# Patient Record
Sex: FEMALE
[De-identification: names, ages, dates, MRNs, and addresses within clinical notes are randomized per-mention and may not be internally consistent; named-entity substitution may affect disease eponyms.]

---

## 2017-04-07 ENCOUNTER — HOSPITAL ENCOUNTER (OUTPATIENT)
Dept: HOSPITAL 42 - ENDO | Age: 72
Discharge: HOME | End: 2017-04-07
Attending: INTERNAL MEDICINE
Payer: COMMERCIAL

## 2017-04-07 VITALS
RESPIRATION RATE: 18 BRPM | HEART RATE: 68 BPM | SYSTOLIC BLOOD PRESSURE: 116 MMHG | OXYGEN SATURATION: 99 % | DIASTOLIC BLOOD PRESSURE: 61 MMHG | TEMPERATURE: 97.7 F

## 2017-04-07 VITALS — BODY MASS INDEX: 24.5 KG/M2

## 2017-04-07 DIAGNOSIS — K64.8: ICD-10-CM

## 2017-04-07 DIAGNOSIS — K51.90: Primary | ICD-10-CM

## 2017-04-07 PROCEDURE — 88305 TISSUE EXAM BY PATHOLOGIST: CPT

## 2017-04-07 PROCEDURE — 45380 COLONOSCOPY AND BIOPSY: CPT

## 2018-02-28 ENCOUNTER — HOSPITAL ENCOUNTER (EMERGENCY)
Dept: HOSPITAL 42 - ED | Age: 73
Discharge: HOME | End: 2018-02-28
Payer: COMMERCIAL

## 2018-02-28 VITALS — DIASTOLIC BLOOD PRESSURE: 77 MMHG | TEMPERATURE: 98 F | SYSTOLIC BLOOD PRESSURE: 130 MMHG | HEART RATE: 80 BPM

## 2018-02-28 VITALS — BODY MASS INDEX: 24.5 KG/M2

## 2018-02-28 VITALS — RESPIRATION RATE: 19 BRPM | OXYGEN SATURATION: 99 %

## 2018-02-28 DIAGNOSIS — R05: ICD-10-CM

## 2018-02-28 DIAGNOSIS — J32.9: Primary | ICD-10-CM

## 2018-02-28 NOTE — RAD
HISTORY:

cough  



COMPARISON:

01/09/2017



TECHNIQUE:

Chest PA and lateral



FINDINGS:



LUNGS:

No active pulmonary disease.



PLEURA:

No significant pleural effusion identified. No pneumothorax apparent.



CARDIOVASCULAR:

Normal.



OSSEOUS STRUCTURES:

No significant abnormalities.



VISUALIZED UPPER ABDOMEN:

Normal.



OTHER FINDINGS:

None.



IMPRESSION:

No active disease.

## 2018-02-28 NOTE — ED PDOC
Arrival/HPI





- General


Chief Complaint: Cough, Cold, Congestion


Time Seen by Provider: 18 14:29


Historian: Patient





- History of Present Illness


Narrative History of Present Illness (Text): 





18 15:16


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 

nasal congestion, facial pressure and right ear pain. States cough started 

3weeks ago. Notes  that she was seen by her PMD 2weeks ago and was only given 

Promethazine. States taking the Promethazine without relieve. She reports 

multiple history of sinusitis. Denies fever, chills, SOB, diaphoresis, sick 

contact, travel, any other complaint.








Past Medical History





- Provider Review


Nursing Documentation Reviewed: Yes





- Past History


Past History: No Previous





- Infectious Disease


Hx of Infectious Diseases: None





- Tetanus Immunization


Tetanus Immunization: Unknown





- Cardiac


Hx Pacemaker: No





- Pulmonary


Hx Respiratory Disorders: Yes


Hx Asthma: Yes


Hx Chronic Obstructive Pulmonary Disease (COPD): Yes





- Neurological


Hx Paralysis: No





- HEENT


Hx HEENT Disorder: Yes


Hx Deafness: Yes (left)





- Renal


Hx Renal Disorder: No





- Endocrine/Metabolic


Hx Endocrine Disorders: No





- Hematological/Oncological


Hx Blood Disorders: Yes


Hx Blood Transfusions: No


Hx Blood Transfusion Reaction: No


Hx Cirrhosis: Yes





- Integumentary


Hx Dermatological Disorder: No





- Musculoskeletal/Rheumatological


Hx Musculoskeletal Disorders: Yes





- Gastrointestinal


Hx Gastrointestinal Disorders: Yes


Hx Colitis: Yes (Ulcerative Colitis dx )





- Genitourinary/Gynecological


Hx Genitourinary Disorders: Yes


Hx Hematuria: Yes





- Psychiatric


Hx Psychophysiologic Disorder: No


Hx Emotional Abuse: No


Hx Physical Abuse: No


Hx Substance Use: No





- Past Surgical History


Past Surgical History: Non-Contributing





- Surgical History


Hx  Section: Yes


Other/Comment: sinus surgery.  gallstones





- Anesthesia


Hx Anesthesia: Yes


Hx Anesthesia Reactions: No


Hx Malignant Hyperthermia: No





- Suicidal Assessment


Feels Threatened In Home Enviroment: No





Family/Social History





- Physician Review


Nursing Documentation Reviewed: Yes


Family/Social History: Unknown Family HX


Smoking Status: Never Smoked


Hx Alcohol Use: No


Hx Substance Use: No


Hx Substance Use Treatment: No





Allergies/Home Meds


Allergies/Adverse Reactions: 


Allergies





aspirin Allergy (Verified 18 13:51)


 ANAPHYLAXIS


ibuprofen Allergy (Verified 18 13:51)


 ANAPHYLAXIS








Home Medications: 


 Home Meds











 Medication  Instructions  Recorded  Confirmed


 


Albuterol Sulfate [Proair 2 puff IH BID PRN 16





Respiclick]   


 


Fluticasone Nasal [Flonase] 1 spr NS BID 16


 


Calcium Carbonate/Vitamin D3 1 tab PO DAILY 17





[Calcium 500 + Vit D Caplet]   


 


Fluticasone/Salmeterol 250/50 1 puff IH BID 17





[Advair Diskus]   


 


Budesonide [Rhinocort Allergy] 1 spray INH DAILY 17


 


Ciprofloxacin/Dexamethasone 1 drop TOP DAILY PRN 17





[Ciprodex Otic]   














Review of Systems





- Physician Review


All systems were reviewed & negative as marked: Yes





- Review of Systems


Constitutional: Normal


Eyes: Normal


ENT: Other (Right ear pain).  absent: Rhinorrhea (Nasal congestion)


Respiratory: Cough.  absent: SOB, Sputum, Wheezing


Cardiovascular: Normal


Gastrointestinal: Normal


Genitourinary Female: Normal


Musculoskeletal: Normal


Skin: Normal


Neurological: Normal


Endocrine: Normal


Hemo/Lymphatic: Normal


Psychiatric: Normal





Physical Exam


Vital Signs Reviewed: Yes


Vital Signs











  Temp Pulse Resp BP Pulse Ox


 


 18 16:29    19   99


 


 18 16:28  98.0 F  80  18  130/77  96


 


 18 13:52  99.2 F  99 H  18  129/65  100











Temperature: Afebrile


Blood Pressure: Normal


Pulse: Regular


Respiratory Rate: Normal


Appearance: Positive for: Well-Appearing, Non-Toxic, Comfortable


Pain Distress: None


Mental Status: Positive for: Alert and Oriented X 3





- Systems Exam


Head: Present: Atraumatic, Normocephalic


Pupils: Present: PERRL


Extroacular Muscles: Present: EOMI


Conjunctiva: Present: Normal


Ears: Present: Erythema (Right TM), Fluid (Right TM).  No: Normal Canal, TM 

Bulging, TM Perf


Mouth: Present: Moist Mucous Membranes


Nose (Internal): Present: Edematous (B/L turbinates), Other (Tenderness over 

the frontal and maxillary sinuses)


Neck: Present: Normal Range of Motion


Respiratory/Chest: Present: Clear to Auscultation, Good Air Exchange.  No: 

Respiratory Distress, Accessory Muscle Use


Cardiovascular: Present: Regular Rate and Rhythm, Normal S1, S2.  No: Murmurs


Abdomen: Present: Normal Bowel Sounds.  No: Tenderness, Distention, Peritoneal 

Signs


Back: Present: Normal Inspection


Upper Extremity: Present: Normal Inspection.  No: Cyanosis, Edema


Lower Extremity: Present: Normal Inspection.  No: Edema


Neurological: Present: GCS=15, CN II-XII Intact, Speech Normal


Skin: Present: Warm, Dry, Normal Color.  No: Rashes


Psychiatric: Present: Alert, Oriented x 3, Normal Insight, Normal Concentration





Medical Decision Making


ED Course and Treatment: 





18 22:26


Pt in ED for stated history. Her CXR was negative. Rapid flu was negative.





PT have sinusitis clinically and was treated with Augmentin. Referred to her 

PMD.





- Lab Interpretations


Lab Results: 


 Lab Results





18 15:22: Influenza Typ A,B (EIA) Negative for flu a/b











- RAD Interpretation


Radiology Orders: 








18 14:29


CHEST TWO VIEWS (PA/LAT) [RAD] Stat 














- Medication Orders


Current Medication Orders: 











Discontinued Medications





Amoxicillin/Clavulanate Potassium (Augmentin 875 Mg-125 Mg Tab)  1 tab PO STAT 

STA


   PRN Reason: Protocol


   Stop: 18 15:15


   Last Admin: 18 15:37  Dose: 1 tab





Prednisone (Prednisone Tab)  40 mg PO STAT STA


   Stop: 18 15:16


   Last Admin: 18 15:37  Dose: 40 mg











Disposition/Present on Arrival





- Present on Arrival


Any Indicators Present on Arrival: No


History of DVT/PE: No


History of Uncontrolled Diabetes: No


Urinary Catheter: No


History of Decub. Ulcer: No


History Surgical Site Infection Following: None





- Disposition


Have Diagnosis and Disposition been Completed?: Yes


Diagnosis: 


 Sinusitis, Cough





Disposition: HOME/ ROUTINE


Disposition Time: 16:15


Patient Plan: Discharge


Condition: STABLE


Discharge Instructions (ExitCare):  Sinusitis in Adults


Additional Instructions: 


Take medication as directed


Follow up with your doctor


Return to ED for any new symptoms


Prescriptions: 


Amoxicillin/Clavulanate [Augmentin 875 MG-125 MG] 1 tab PO BID #14 tab


Referrals: 


[facility] [Outside] - Follow up with primary


Forms:  [employer] (English)

## 2018-04-24 ENCOUNTER — HOSPITAL ENCOUNTER (OUTPATIENT)
Dept: HOSPITAL 42 - ED | Age: 73
Discharge: HOME | End: 2018-04-24
Attending: RADIOLOGY
Payer: COMMERCIAL

## 2018-04-24 VITALS
SYSTOLIC BLOOD PRESSURE: 136 MMHG | DIASTOLIC BLOOD PRESSURE: 80 MMHG | RESPIRATION RATE: 18 BRPM | HEART RATE: 79 BPM | TEMPERATURE: 97.9 F

## 2018-04-24 VITALS — BODY MASS INDEX: 23.1 KG/M2

## 2018-04-24 VITALS — OXYGEN SATURATION: 99 %

## 2018-04-24 DIAGNOSIS — Z88.6: ICD-10-CM

## 2018-04-24 DIAGNOSIS — J44.9: ICD-10-CM

## 2018-04-24 DIAGNOSIS — C22.1: Primary | ICD-10-CM

## 2018-04-24 LAB
ALBUMIN SERPL-MCNC: 3.1 G/DL (ref 3–4.8)
ALBUMIN/GLOB SERPL: 0.7 {RATIO} (ref 1.1–1.8)
ALT SERPL-CCNC: 48 U/L (ref 7–56)
AST SERPL-CCNC: 78 U/L (ref 14–36)
BASOPHILS # BLD AUTO: 0.01 K/MM3 (ref 0–2)
BASOPHILS NFR BLD: 0.2 % (ref 0–3)
BUN SERPL-MCNC: 13 MG/DL (ref 7–21)
CALCIUM SERPL-MCNC: 8.7 MG/DL (ref 8.4–10.5)
EOSINOPHIL # BLD: 0.4 10*3/UL (ref 0–0.7)
EOSINOPHIL NFR BLD: 7.8 % (ref 1.5–5)
ERYTHROCYTE [DISTWIDTH] IN BLOOD BY AUTOMATED COUNT: 14.8 % (ref 11.5–14.5)
GFR NON-AFRICAN AMERICAN: > 60
GRANULOCYTES # BLD: 3.49 10*3/UL (ref 1.4–6.5)
GRANULOCYTES NFR BLD: 64.6 % (ref 50–68)
HGB BLD-MCNC: 10.6 G/DL (ref 12–16)
LIPASE SERPL-CCNC: 201 U/L (ref 23–300)
LYMPHOCYTES # BLD: 1.1 10*3/UL (ref 1.2–3.4)
LYMPHOCYTES NFR BLD AUTO: 20.4 % (ref 22–35)
MCH RBC QN AUTO: 31.7 PG (ref 25–35)
MCHC RBC AUTO-ENTMCNC: 33.3 G/DL (ref 31–37)
MCV RBC AUTO: 95.2 FL (ref 80–105)
MONOCYTES # BLD AUTO: 0.4 10*3/UL (ref 0.1–0.6)
MONOCYTES NFR BLD: 7 % (ref 1–6)
PLATELET # BLD: 222 10^3/UL (ref 120–450)
PMV BLD AUTO: 11.6 FL (ref 7–11)
RBC # BLD AUTO: 3.34 10^6/UL (ref 3.5–6.1)
WBC # BLD AUTO: 5.4 10^3/UL (ref 4.5–11)

## 2018-04-24 PROCEDURE — 76937 US GUIDE VASCULAR ACCESS: CPT

## 2018-04-24 PROCEDURE — 36561 INSERT TUNNELED CV CATH: CPT

## 2018-04-24 PROCEDURE — 96365 THER/PROPH/DIAG IV INF INIT: CPT

## 2018-04-24 PROCEDURE — 80053 COMPREHEN METABOLIC PANEL: CPT

## 2018-04-24 PROCEDURE — 77001 FLUOROGUIDE FOR VEIN DEVICE: CPT

## 2018-04-24 PROCEDURE — 85025 COMPLETE CBC W/AUTO DIFF WBC: CPT

## 2018-04-24 PROCEDURE — 99152 MOD SED SAME PHYS/QHP 5/>YRS: CPT

## 2018-04-24 PROCEDURE — 99153 MOD SED SAME PHYS/QHP EA: CPT

## 2018-04-24 PROCEDURE — 83690 ASSAY OF LIPASE: CPT

## 2018-04-24 PROCEDURE — 99284 EMERGENCY DEPT VISIT MOD MDM: CPT

## 2018-04-24 NOTE — VASCULAR
PROCEDURE:  Ultrasound and fluoroscopic right internal jugular venous 

access port.



CLINICAL HISTORY:  Cholangiocarcinoma.Venous port for chemotherapy.



PHYSICIAN(S):  Salas Jules M.D.



TECHNIQUE:

The relative risks and indications of the procedure were explained to 

the patient and consent obtained. The patient was placed supine on 

the arteriogram table and the right neck and chest prepped and draped 

in the usual sterile fashion. Conscious sedation monitoring was 

provided throughout the procedure by a nurse. Antibiotics were given 

prior to the procedure.



Under direct ultrasound guidance, the right internal jugular vein was 

punctured with a micro-puncture set. A 0.035 angled Glidewire was 

advanced into the IVC. A 4 cm incision was made below the right 

clavicle and the pocket blunted dissected. A 8 [place] single-lumen 

catheter, 18 cm long, was advanced to the SVC/RA junction. The 

catheter was trimmed and attached to the port. The port aspirates and 

injects easily. The port was placed in the pocket and closed in 2 

layers. The patient tolerated the procedure well.



IMPRESSION:

Ultrasound and fluoroscopically placed right internal jugular venous 

access port.

## 2018-04-24 NOTE — ED PDOC
Arrival/HPI





- General


Chief Complaint: Abdominal Pain


Time Seen by Provider: 04/24/18 09:48


Historian: Patient





- History of Present Illness


Narrative History of Present Illness (Text): 


04/24/18 10:29


Patient is a 73 year old female with a past medical history bile duct cancer 

with recent stent placement on 04/20/18, who presents to the emergency 

department for port placement. Patient reports she will be starting 

chemotherapy tomorrow. Patient notes mild abdominal discomfort, which she has 

been experiencing intermittently for some time now. Patient denies any fever 

chills, headache, dizziness, chest pain, shortness of breath, dyspnea on 

exertion, cough, nausea, vomiting, diarrhea, back pain, neck pain, or any other 

complaint.





PMD: Dr. Milian


 





Past Medical History





- Provider Review


Nursing Documentation Reviewed: Yes





- Past History


Past History: No Previous





- Infectious Disease


Hx of Infectious Diseases: None





- Tetanus Immunization


Tetanus Immunization: Unknown





- Cardiac


Hx Pacemaker: No





- Pulmonary


Hx Respiratory Disorders: Yes


Hx Asthma: Yes


Hx Chronic Obstructive Pulmonary Disease (COPD): Yes





- Neurological


Hx Paralysis: No





- HEENT


Hx HEENT Disorder: Yes


Hx Deafness: Yes (left)





- Renal


Hx Renal Disorder: No





- Endocrine/Metabolic


Hx Endocrine Disorders: No





- Hematological/Oncological


Hx Blood Disorders: No





- Integumentary


Hx Dermatological Disorder: No





- Musculoskeletal/Rheumatological


Hx Musculoskeletal Disorders: Yes





- Gastrointestinal


Hx Gastrointestinal Disorders: Yes


Hx Colitis: Yes (Ulcerative Colitis dx 2011)





- Genitourinary/Gynecological


Hx Genitourinary Disorders: Yes


Hx Hematuria: Yes





- Psychiatric


Hx Psychophysiologic Disorder: Yes


Hx Substance Use: No





- Past Surgical History


Past Surgical History: Non-Contributing





- Surgical History


Other/Comment: sinus sx 4x





- Anesthesia


Hx Anesthesia: Yes


Hx Anesthesia Reactions: No


Hx Malignant Hyperthermia: No





- Suicidal Assessment


Feels Threatened In Home Enviroment: No





Family/Social History





- Physician Review


Nursing Documentation Reviewed: Yes


Family/Social History: No Known Family HX


Smoking Status: Never Smoked


Hx Alcohol Use: No


Hx Substance Use: No


Hx Substance Use Treatment: No





Allergies/Home Meds


Allergies/Adverse Reactions: 


Allergies





aspirin Allergy (Verified 04/24/18 10:13)


 ANAPHYLAXIS


ibuprofen Allergy (Verified 04/24/18 10:13)


 ANAPHYLAXIS








Home Medications: 


 Home Meds











 Medication  Instructions  Recorded  Confirmed


 


Albuterol Sulfate [Proair 2 puff IH BID PRN 07/30/16 04/24/18





Respiclick]   


 


Fluticasone Nasal [Flonase] 1 spr NS BID 12/09/16 04/24/18


 


Calcium Carbonate/Vitamin D3 1 tab PO DAILY 03/29/17 04/24/18





[Calcium 500 + Vit D Caplet]   


 


Fluticasone/Salmeterol 250/50 1 puff IH BID 03/29/17 04/24/18





[Advair Diskus]   


 


Budesonide [Rhinocort Allergy] 1 spray INH DAILY 04/07/17 04/24/18


 


Ciprofloxacin/Dexamethasone 1 drop TOP DAILY PRN 04/07/17 04/24/18





[Ciprodex Otic]   














Review of Systems





- Physician Review


All systems were reviewed & negative as marked: Yes





- Review of Systems


Constitutional: absent: Fevers, Night Sweats


Respiratory: absent: SOB, Cough


Cardiovascular: absent: Chest Pain, JONES


Gastrointestinal: Abdominal Pain.  absent: Diarrhea, Nausea, Vomiting


Musculoskeletal: absent: Back Pain, Neck Pain


Neurological: absent: Headache, Dizziness





Physical Exam


Vital Signs Reviewed: Yes


Vital Signs











  Temp Pulse Resp BP Pulse Ox


 


 04/24/18 15:08      99


 


 04/24/18 13:05  97.9 F  79  18  136/80  99


 


 04/24/18 12:55  98.4 F  74  16  125/63  99


 


 04/24/18 12:40  98.4 F  74  16  122/63  99


 


 04/24/18 12:25  98.4 F  78  16  133/78  99


 


 04/24/18 10:14   82  17  125/71  100











Blood Pressure: Normal


Pulse: Regular


Respiratory Rate: Normal


Appearance: Positive for: Well-Appearing, Non-Toxic, Comfortable


Pain Distress: None


Mental Status: Positive for: Alert and Oriented X 3





- Systems Exam


Head: Present: Atraumatic, Normocephalic


Pupils: Present: PERRL


Extroacular Muscles: Present: EOMI


Conjunctiva: Present: Normal


Mouth: Present: Moist Mucous Membranes


Neck: Present: Normal Range of Motion


Respiratory/Chest: Present: Clear to Auscultation, Good Air Exchange.  No: 

Respiratory Distress, Accessory Muscle Use


Cardiovascular: Present: Regular Rate and Rhythm, Normal S1, S2.  No: Murmurs


Abdomen: Present: Tenderness (epigastric tenderness), Normal Bowel Sounds, 

Guarding, Other (Wounds are clean, dry and intact).  No: Distention, Peritoneal 

Signs, Rebound


Back: Present: Normal Inspection


Upper Extremity: Present: Normal Inspection.  No: Cyanosis, Edema


Lower Extremity: Present: Normal Inspection.  No: Edema


Neurological: Present: GCS=15, CN II-XII Intact, Speech Normal


Skin: Present: Warm, Dry, Normal Color.  No: Rashes


Psychiatric: Present: Alert, Oriented x 3, Normal Insight, Normal Concentration





Medical Decision Making


ED Course and Treatment: 


04/24/18 10:28


Impression:


Patient is a 73 year old female who presents for port placement. Patient notes 

old abdominal pain.





Differential Diagnosis included but are not limited to:  Port placement, Acute 

on chronic abdominal pain





Plan:


-- Labs


-- Pepcid


-- Reassess and disposition





Prior Visits:


Notes and results from previous visits were reviewed. Patient last seen in ED 

on 2/28/18 complaining of cough, cold, and congestion.    





Progress Notes:


04/24/18 10:29


Case discussed with Dr. Salas Jules, accepts patient for port placement. 

Patient aware of and in agreement with plan. 





Labs reviewed. 





- Lab Interpretations


Lab Results: 








 04/24/18 10:30 





 04/24/18 10:30 





 Lab Results





04/24/18 10:30: Sodium 141, Potassium 3.8, Chloride 108 H, Carbon Dioxide 23, 

Anion Gap 14, BUN 13, Creatinine 0.6 L, Est GFR ( Amer) > 60, Est GFR (

Non-Af Amer) > 60, Random Glucose 106, Calcium 8.7, Total Bilirubin 2.2 H, AST 

78 H D, ALT 48, Alkaline Phosphatase 266 H, Total Protein 7.4, Albumin 3.1, 

Globulin 4.4, Albumin/Globulin Ratio 0.7 L, Lipase 201


04/24/18 10:30: WBC 5.4, RBC 3.34 L, Hgb 10.6 L, Hct 31.8 L, MCV 95.2  D, MCH 

31.7, MCHC 33.3, RDW 14.8 H, Plt Count 222, MPV 11.6 H, Gran % 64.6, Lymph % (

Auto) 20.4 L, Mono % (Auto) 7.0 H, Eos % (Auto) 7.8 H, Baso % (Auto) 0.2, Gran 

# 3.49, Lymph # (Auto) 1.1 L, Mono # (Auto) 0.4, Eos # (Auto) 0.4, Baso # (Auto

) 0.01








I have reviewed the lab results: Yes





- RAD Interpretation


Radiology Orders: 








04/24/18 10:08


CAR PERIPHERAL VASCULAR ORDER [VASCULAR] Stat 














- Medication Orders


Current Medication Orders: 








Acetaminophen (Tylenol 325mg Tab)  650 mg PO Q4 PRN


   PRN Reason: Pain, Mild (1-3)


Sodium Chloride (Sodium Chloride 0.45%)  1,000 mls @ 80 mls/hr IV .[id number] EDISON


Ondansetron HCl (Zofran Inj)  4 mg IVP Q6H PRN


   PRN Reason: Nausea/Vomiting


Oxycodone/Acetaminophen (Percocet 5/325 Mg Tab)  1 tab PO Q4H PRN


   PRN Reason: Pain, moderate (4-7)


   Stop: 04/27/18 12:12





Discontinued Medications





Famotidine (Pepcid 20mg/50ml Premix)  20 mg in 50 mls @ 100 mls/hr IVPB STAT STA


   Stop: 04/24/18 10:59


   Last Admin: 04/24/18 10:49  Dose: 100 mls/hr





eMAR Start Stop


 Document     04/24/18 10:49  SZA  (Rec: 04/24/18 10:50  SZA  [id number])


     Intravenous Solution


      Start Date                                 04/24/18


      Start Time                                 10:50


      End Date                                   04/24/18


      End time                                   11:10


      Total Infusion Time                        20














- Scribe Statement


The provider has reviewed the documentation as recorded by the Scribe





[employer] Sylvia Kwok


Provider Scribe Attestation:


All medical record entries made by the Scribe were at my direction and 

personally dictated by me. I have reviewed the chart and agree that the record 

accurately reflects my personal performance of the history, physical exam, 

medical decision making, and the department course for this patient. I have 

also personally directed, reviewed, and agree with the discharge instructions 

and disposition.








Disposition/Present on Arrival





- Present on Arrival


Any Indicators Present on Arrival: No


History of DVT/PE: No


History of Uncontrolled Diabetes: No


Urinary Catheter: No


History of Decub. Ulcer: No


History Surgical Site Infection Following: None





- Disposition


Have Diagnosis and Disposition been Completed?: Yes


Diagnosis: 


 Abdominal pain





Disposition: HOME/ ROUTINE


Disposition Time: 10:29


Patient Plan: Admission


Condition: FAIR

## 2018-06-06 ENCOUNTER — HOSPITAL ENCOUNTER (INPATIENT)
Dept: HOSPITAL 42 - ED | Age: 73
LOS: 9 days | Discharge: NURSING, REHAB OR LONG-TERM CARE | DRG: 872 | End: 2018-06-15
Attending: INTERNAL MEDICINE | Admitting: INTERNAL MEDICINE
Payer: COMMERCIAL

## 2018-06-06 VITALS — BODY MASS INDEX: 24.9 KG/M2

## 2018-06-06 DIAGNOSIS — R65.20: ICD-10-CM

## 2018-06-06 DIAGNOSIS — C78.7: ICD-10-CM

## 2018-06-06 DIAGNOSIS — H91.8X2: ICD-10-CM

## 2018-06-06 DIAGNOSIS — Z86.19: ICD-10-CM

## 2018-06-06 DIAGNOSIS — J32.9: ICD-10-CM

## 2018-06-06 DIAGNOSIS — R50.81: ICD-10-CM

## 2018-06-06 DIAGNOSIS — D61.818: ICD-10-CM

## 2018-06-06 DIAGNOSIS — J44.9: ICD-10-CM

## 2018-06-06 DIAGNOSIS — K83.0: ICD-10-CM

## 2018-06-06 DIAGNOSIS — Z87.891: ICD-10-CM

## 2018-06-06 DIAGNOSIS — G43.909: ICD-10-CM

## 2018-06-06 DIAGNOSIS — B37.0: ICD-10-CM

## 2018-06-06 DIAGNOSIS — Z66: ICD-10-CM

## 2018-06-06 DIAGNOSIS — R33.9: ICD-10-CM

## 2018-06-06 DIAGNOSIS — D70.3: ICD-10-CM

## 2018-06-06 DIAGNOSIS — M06.9: ICD-10-CM

## 2018-06-06 DIAGNOSIS — E87.6: ICD-10-CM

## 2018-06-06 DIAGNOSIS — Z88.6: ICD-10-CM

## 2018-06-06 DIAGNOSIS — A41.9: Primary | ICD-10-CM

## 2018-06-06 DIAGNOSIS — C22.1: ICD-10-CM

## 2018-06-06 DIAGNOSIS — M79.7: ICD-10-CM

## 2018-06-06 DIAGNOSIS — R18.8: ICD-10-CM

## 2018-06-06 DIAGNOSIS — M31.6: ICD-10-CM

## 2018-06-06 DIAGNOSIS — K74.60: ICD-10-CM

## 2018-06-06 DIAGNOSIS — A04.72: ICD-10-CM

## 2018-06-06 DIAGNOSIS — K51.90: ICD-10-CM

## 2018-06-06 DIAGNOSIS — M19.90: ICD-10-CM

## 2018-06-06 LAB
ALBUMIN SERPL-MCNC: 2.2 G/DL (ref 3–4.8)
ALBUMIN SERPL-MCNC: 2.3 G/DL (ref 3–4.8)
ALBUMIN/GLOB SERPL: 0.6 {RATIO} (ref 1.1–1.8)
ALBUMIN/GLOB SERPL: 0.6 {RATIO} (ref 1.1–1.8)
ALT SERPL-CCNC: 49 U/L (ref 7–56)
ALT SERPL-CCNC: 50 U/L (ref 7–56)
APTT BLD: 31.9 SECONDS (ref 25.1–36.5)
AST SERPL-CCNC: 39 U/L (ref 14–36)
AST SERPL-CCNC: 43 U/L (ref 14–36)
BASOPHILS # BLD AUTO: 0 K/MM3 (ref 0–2)
BASOPHILS NFR BLD: 0 % (ref 0–3)
BUN SERPL-MCNC: 14 MG/DL (ref 7–21)
BUN SERPL-MCNC: 14 MG/DL (ref 7–21)
CALCIUM SERPL-MCNC: 7.7 MG/DL (ref 8.4–10.5)
CALCIUM SERPL-MCNC: 8 MG/DL (ref 8.4–10.5)
EOSINOPHIL # BLD: 0 10*3/UL (ref 0–0.7)
EOSINOPHIL NFR BLD: 0 % (ref 1.5–5)
ERYTHROCYTE [DISTWIDTH] IN BLOOD BY AUTOMATED COUNT: 20.2 % (ref 11.5–14.5)
GFR NON-AFRICAN AMERICAN: > 60
GFR NON-AFRICAN AMERICAN: > 60
GRANULOCYTES # BLD: 0 10*3/UL (ref 1.4–6.5)
GRANULOCYTES NFR BLD: 0 % (ref 50–68)
HEPATITIS A IGM: NEGATIVE
HEPATITIS B CORE AB: NEGATIVE
HEPATITIS C ANTIBODY: NEGATIVE
HGB BLD-MCNC: 8.1 G/DL (ref 12–16)
HYPOCHROMIA BLD QL SMEAR: (no result)
INR PPP: 2 (ref 0.93–1.08)
LYMPHOCYTE: 53 % (ref 22–35)
LYMPHOCYTES # BLD: 0.5 10*3/UL (ref 1.2–3.4)
LYMPHOCYTES NFR BLD AUTO: 53.8 % (ref 22–35)
MCH RBC QN AUTO: 31.4 PG (ref 25–35)
MCHC RBC AUTO-ENTMCNC: 34 G/DL (ref 31–37)
MCV RBC AUTO: 92.2 FL (ref 80–105)
MONOCYTE: 43 % (ref 1–6)
MONOCYTES # BLD AUTO: 0.4 10*3/UL (ref 0.1–0.6)
MONOCYTES NFR BLD: 46.2 % (ref 1–6)
NEUTROPHILS NFR BLD AUTO: 4 % (ref 50–70)
PLATELET # BLD EST: (no result) 10*3/UL
PLATELET # BLD: 31 10^3/UL (ref 120–450)
PROTHROMBIN TIME: 23.1 SECONDS (ref 9.4–12.5)
RBC # BLD AUTO: 2.58 10^6/UL (ref 3.5–6.1)
TROPONIN I SERPL-MCNC: < 0.01 NG/ML
WBC # BLD AUTO: 0.9 10^3/UL (ref 4.5–11)

## 2018-06-06 PROCEDURE — 30233K1 TRANSFUSION OF NONAUTOLOGOUS FROZEN PLASMA INTO PERIPHERAL VEIN, PERCUTANEOUS APPROACH: ICD-10-PCS | Performed by: INTERNAL MEDICINE

## 2018-06-06 RX ADMIN — NYSTATIN SCH ML: 100000 SUSPENSION ORAL at 22:30

## 2018-06-06 RX ADMIN — METRONIDAZOLE SCH MLS/HR: 500 INJECTION, SOLUTION INTRAVENOUS at 19:44

## 2018-06-06 RX ADMIN — VANCOMYCIN HYDROCHLORIDE SCH MLS/HR: 1 INJECTION, POWDER, LYOPHILIZED, FOR SOLUTION INTRAVENOUS at 22:29

## 2018-06-06 NOTE — ED PDOC
Arrival/HPI





- Critical Care


Critical Care Minutes: 75 minutes





- General


Chief Complaint: Weakness/Neurological Deficit


Time Seen by Provider: 18 10:36





- Critical Care


Narrative Critical Care (Text): 





18 13:59


This is a 72 yo F with PMH of including Ulcerative Collitis, Cirrhosis, COPD, 

Giant cell arteritis, primary sclerosing cholangitis, and recently diagnosed 

Biliary cancer with mets to liver s/p biliary stent placement (2018) who 

presents with progressively worsening diarrhea x1 week, worsening weakness, 

lethargy, and acute onset jaundice.  Hx primarily through daughters at bedside 

given patient's lethargy, but she is oriented x3 (self, location, year), and is 

able to follow non-taxing commands appropriately.  As per daughters, patient 

underwent chemotherapy for her biliary cancer most recently 7 days prior ().  Since that time, she has developed worsening diarrhea, watery non-bloody, 

started at 2 times per day as per patient, now up to >5 times per day.  Patient 

denies any emesis, but does report nausea and "spitting up" clear to white 

phlegm mutiple times per day for the last 2 days.  Denies chest pain, shortness 

of breath, hemoptysis, syncope, or focal weakness, but daughters report she has 

been generally weak to the point of being essentially bedridden for the last 3 

days.  Denies fecal incontinence, but has accidents due to being unable to 

reach the bathroom from her bed in time (as per daughters).  Complaining of 

chills throughout exam despite multiple blankets.





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 

Hep A, asthma, COPD, Giant cell arteritis, Allergic Rhinitis, Sinusitis, 

Arthritis, Fibromyalgia, primary sclerosing cholangitis, and Migraines


PSH: sinus surgeries x4, cholecystectomy, 


FHx: asthma (Mother, Son), HTN (Mother)


SHx: lives alone, home health aide 2x per week, 2 daughters who also provide 

daily care,


       Former smoker; denies alcohol, illicits/IVDA





PMD: Previously Dr. Alaniz, now Dr. Milian


 (MicheleAmadeo)





Past Medical History





- Past History


Past History: No Previous





- Infectious Disease


Hx of Infectious Diseases: None





- Tetanus Immunization


Tetanus Immunization: Unknown





- Cardiac


Hx Pacemaker: No





- Pulmonary


Hx Respiratory Disorders: Yes


Hx Asthma: Yes


Hx Chronic Obstructive Pulmonary Disease (COPD): Yes





- Neurological


Hx Paralysis: No





- HEENT


Hx HEENT Disorder: Yes


Hx Deafness: Yes (left)





- Renal


Hx Renal Disorder: No





- Endocrine/Metabolic


Other/Comment: Stage 4 biliary CA





- Hematological/Oncological


Hx Cancer: Yes (stage 4 biliary CA)





- Integumentary


Hx Dermatological Disorder: No





- Musculoskeletal/Rheumatological


Hx Musculoskeletal Disorders: Yes





- Gastrointestinal


Hx Gastrointestinal Disorders: Yes


Hx Colitis: Yes (Ulcerative Colitis dx )





- Genitourinary/Gynecological


Hx Genitourinary Disorders: Yes


Hx Hematuria: Yes





- Psychiatric


Hx Psychophysiologic Disorder: Yes


Hx Substance Use: No





- Past Surgical History


Past Surgical History: Non-Contributing





- Surgical History


Other/Comment: sinus sx 4x





- Anesthesia


Hx Anesthesia: Yes


Hx Anesthesia Reactions: No


Hx Malignant Hyperthermia: No





- Suicidal Assessment


Feels Threatened In Home Enviroment: No





Family/Social History


Family/Social History: Hypertension, Other (asthma)


Smoking Status: Never Smoked


Hx Alcohol Use: No


Hx Substance Use: No


Hx Substance Use Treatment: No





Allergies/Home Meds


Allergies/Adverse Reactions: 


Allergies





aspirin Allergy (Verified 18 10:13)


 ANAPHYLAXIS


ibuprofen Allergy (Verified 18 10:13)


 ANAPHYLAXIS








Home Medications: 


 Home Meds











 Medication  Instructions  Recorded  Confirmed


 


Albuterol Sulfate [Proair 2 puff IH BID PRN 16





Respiclick]   


 


Fluticasone Nasal [Flonase] 1 spr NS BID 16


 


Calcium Carbonate/Vitamin D3 1 tab PO DAILY 17





[Calcium 500 + Vit D Caplet]   


 


Fluticasone/Salmeterol 250/50 1 puff IH BID 17





[Advair Diskus]   


 


Budesonide [Rhinocort Allergy] 1 spray INH DAILY 17


 


Ciprofloxacin/Dexamethasone 1 drop TOP DAILY PRN 17





[Ciprodex Otic]   














Review of Systems





- Physician Review


All systems were reviewed & negative as marked: Yes (as per HPI)





Physical Exam


Vital Signs Reviewed: Yes


Temperature: Febrile


Blood Pressure: Normal


Pulse: Tachycardic


Respiratory Rate: Tachypneic


Appearance: Positive for: Ill-Appearing, Cachectic, Other (grossly jaundiced)


Pain Distress: Mild


Mental Status: Positive for: Alert and Oriented X 3, Lethargic.  No: Confused





- Systems Exam


Head: Present: Atraumatic.  No: Tenderness, Contusion, Swelling


Pupils: Present: PERRL.  No: Pinpoint


Extroacular Muscles: Present: EOMI.  No: Other


Conjunctiva: Present: Icteric.  No: Injected


Mouth: Present: Dry.  No: Moist Mucous Membranes, Normal Lips (cracked and dry 

appearing lips)


Nose (External): Present: Atraumatic.  No: Abrasion, Laceration


Nose (Internal): Present: No Active Bleeding.  No: Moist, Epistaxis


Neck: Present: Normal Range of Motion.  No: JVD


Respiratory/Chest: Present: Decreased Breath Sounds.  No: Respiratory Distress, 

Accessory Muscle Use, Wheezes, Rales, Rhonchi


Cardiovascular: Present: Normal S1, S2, Peripheal Pulses Present (+1 bilateral 

radial pulses, unable to palpate LE pulses), Tachycardic.  No: Regular Rate and 

Rhythm, Murmurs, Irregular Rhythm, Bradycardic


Abdomen: Present: Distention (not firm or rigid, but distended).  No: Tenderness

, Normal Bowel Sounds (hyperactive bowel sounds), Feeding Tubes


Back: Present: Normal Inspection


Upper Extremity: Present: Normal Inspection, Normal ROM (minimal active ROM 

spontaneously, but passive ROM intact and able to move on command), NORMAL 

PULSES.  No: Cyanosis, Edema, Tenderness, Swelling, Erythema


Lower Extremity: Present: Edema (+1 pitting edema in fee/ankles covered by socks

, +2 for remaineder of bilateral LE below the knees), Normal ROM (minimal 

active ROM spontaneously, but passive ROM intact and able to move on command), 

Swelling, Temperature Abnormalties (cool to palpation).  No: Normal Inspection, 

CALF TENDERNESS, NORMAL PULSES (unable to palpate), Tenderness, Erythema


Neurological: Present: GCS=15, Other (minimal spontaneous movements due to 

lethargy, but active ROM on command appropriate).  No: Speech Normal (slow and 

faint speech, but purposeful and appropriate speech)


Skin: Present: Warm, Dry, Other (Grossly icteric).  No: Normal Color


Lymphatic: Present: Cervical Adenopathy


Psychiatric: Present: Oriented x 3, Lethargic.  No: Alert (awake but very 

lethargic), Normal Concentration


Vital Signs











  Temp Pulse Resp BP Pulse Ox


 


 18 16:14  100 F H  112 H  16  108/56 L 


 


 18 14:25  99.2 F  99 H  18  116/65  99


 


 18 12:25  100.9 F H  102 H  18  118/61  99


 


 18 11:33   109 H  18  121/64  99


 


 18 10:18  102.6 F H  110 H  17  119/63  99














Medical Decision Making


Re-evaluation Time: 14:30


Reassessment Condition: Re-examined, Unchanged





- Critical Care


Critical Care Minutes: 75 minutes


ED Course and Treatment: 





18


Patient Seen With Resident:


In agreement with resident note which contains more details about the patient. 

Patient was seen and evaluated with resident. Came up with plan and treatment 

together. 





EKG shows sinus tachycardia at 109 BPM with no ST/T wave changes. Interpreted 

by me. 


 (Lance Pradhan)








18 14:10


Concerning for possible C. Diff colitis given repeated health care setting and 

recent chemotherapy.  Will obtain CT abd pevis, CXR given cough with clear 

sputum (r/o occult pneumonia), CBC, CMP, Mag, Phos, EKG, blood and urine 

cultures, UA, stool C diff.  Will also given 1L NS over 2 hours given overtly 

clinically dry appearing in setting of worsening diarrhea.  Will start empiric 

antibiotics after cultures drawn.





18 15:03


Lab reports WBCs 0.9, Platelets 31, ordered repeat draw to confirm.  Given 

presenting temp 102.6, concern for neutropenic fever, start neutropenic 

precautions, given Cefepime 2g x1.  Chemistries concerning for elevated TBili, 

CT abd/pelvis notable for possible pancolitis, biliary duct dilation around 

stent.





18 15:18


Hgb 8.1, Plt 31, and WBCs 0.9, ANC ~ 50 (confirmed by lab).  Case discussed 

with pt's Heme-onc, Dr. Romano, who ordered Neuopeng 100mg SC (first dose now), 

2 units pRBCs (likely falsely elevated Hgb given dehydrated state 2/2 diarrhea)

, and platelets from single donor 1 unit.  After discussion with Heme-onc, 

coags returned, notable for INR 2.0 on no anticoagulation; ordered 2 units FFP.





After prolonged discussion with daughters regarding Code Status with pt's 

daughters, especially in light of current condition, daughters report patient 

currently wishes to remain full code, but agree that family discussion is needed

, and that her condition is grave and likely warrants DNR/DNI, pending 

discussion with patient and her consent.  Full code status ordered for now.





18 15:48


Case discussed with PMD, Dr. Milian.  Agrees with admission, requests ICU eval 

for ICU placement.  ID consult (Dr. Sarmiento) and GI consult (Dr. Valencia) 

placed as per his orders.  Intensivist notified regarding consult, agrees to 

come evaluate patient.





18 16:22


Seen by Intensivist Dr. Reid, agrees to ICU admision.  Admission orders 

place.  Started on Merrem and Vanco as per ID.





Patient seen, reviewed, and discussed with attending, Dr. Pradhan. ([facility])





- Lab Interpretations


Lab Results: 








 18 12:00 





 18 12:00 





 Lab Results





18 16:05: Fibrin Degrad Products >10 <40 ug/ml


18 15:51: Fibrinogen 358


18 14:02: POC Glucose (mg/dL) 85


18 13:30: Blood Type O POSITIVE, Antibody Screen Negative, Crossmatch See 

Detail, BBK History Checked Patient has bt


18 13:30: ANATOLIY, Poly Interpret Negative


18 13:25: Manual Plt Count 30 L*


18 13:25: Lactate Dehydrogenase 293 L


18 13:25: PT 23.1 H, INR 2.00 H, APTT 31.9


18 12:00: Sodium 134, Potassium 3.8, Chloride 104, Carbon Dioxide 21, 

Anion Gap 12, BUN 14, Creatinine 0.5 L, Est GFR ( Amer) > 60, Est GFR (

Non-Af Amer) > 60, Random Glucose 94, Calcium 8.0 L, Phosphorus 2.9, Magnesium 

1.7, Total Bilirubin 7.8 H, AST 43 H D, ALT 50, Alkaline Phosphatase 187 H D, 

Troponin I < 0.01, Total Protein 5.9, Albumin 2.3 L, Globulin 3.6, Albumin/

Globulin Ratio 0.6 L


18 12:00: WBC 0.9 L* D, RBC 2.58 L, Hgb 8.1 L D, Hct 23.8 L, MCV 92.2  D, 

MCH 31.4, MCHC 34.0, RDW 20.2 H, Plt Count 31 L*, Gran % 0.0 L, Lymph % (Auto) 

53.8 H, Mono % (Auto) 46.2 H, Eos % (Auto) 0.0 L, Baso % (Auto) 0.0, Gran # 

0.00 L, Lymph # (Auto) 0.5 L, Mono # (Auto) 0.4, Eos # (Auto) 0.0, Baso # (Auto

) 0.00, Neutrophils % (Manual) 4 L, Lymphocytes % (Manual) 53 H, Monocytes % (

Manual) 43 H, Platelet Evaluation Low, Hypochromasia 1+











- RAD Interpretation


Radiology Orders: 








18 11:25


ABD & PELVIS W/O PO OR IV CONT [CT] Stat 


CHEST ONE VIEW [RAD] Stat 














- Medication Orders


Current Medication Orders: 








Meropenem 500 mg/ Sodium (Chloride)  50 mls @ 100 mls/hr IVPB Q8 EDISON


   PRN Reason: Protocol


   Stop: 18 22:29


Vancomycin HCl (Vancomycin 1gm)  1 gm in 250 mls @ 167 mls/hr IVPB Q12H EDISON


   PRN Reason: Protocol


Metronidazole (Flagyl)  500 mg in 100 mls @ 100 mls/hr IVPB Q8 EDISON


   PRN Reason: Protocol


Sodium Chloride (Sodium Chloride 0.9%)  1,000 mls @ 999 mls/hr IV .Q1H1M STA


   Stop: 18 16:52


Dextrose/Sodium Chloride (Dextrose 5%/0.9% Ns 1000 Ml)  1,000 mls @ 100 mls/hr 

IV .Q10H EDISON





Discontinued Medications





Sodium Chloride (Sodium Chloride 0.9%)  1,000 mls @ 500 mls/hr IV .Q2H STA


   Stop: 18 13:24


   Last Admin: 18 11:39  Dose: 500 mls/hr





eMAR Start Stop


 Document     18 11:39  LA  (Rec: 18 11:40  LA  [id number])


     Intravenous Solution


      Start Date                                 06/06/18


      Start Time                                 11:39


      End Date                                   18


      End time                                   13:39


      Total Infusion Time                        120





Cefepime HCl (Maxipime 2gm)  2 gm in 100 mls @ 100 mls/hr IVPB STAT STA


   PRN Reason: Protocol


   Stop: 18 13:47


   Last Admin: 18 14:38  Dose: 100 mls/hr





eMAR Start Stop


 Document     18 14:38  LA  (Rec: 18 14:39  LA  [id number])


     Intravenous Solution


      Start Date                                 18


      Start Time                                 14:39


      End Date                                   18


      End time                                   15:39


      Total Infusion Time                        60





Pantoprazole Sodium (Protonix Inj)  40 mg IVP STAT STA


   Stop: 18 16:01











Disposition/Present on Arrival





- Present on Arrival


Any Indicators Present on Arrival: No


History of DVT/PE: No


History of Uncontrolled Diabetes: No


Urinary Catheter: No


History of Decub. Ulcer: No


History Surgical Site Infection Following: None





- Disposition


Have Diagnosis and Disposition been Completed?: Yes


Disposition Time: 16:30


Isolation: Special Contact (Neutropenic precautions)


Patient Plan: Admission, ICU





- Disposition


Diagnosis: 


 Cholangiocarcinoma, Neutropenic fever





Disposition: HOSPITALIZED


Patient Problems: 


 Current Active Problems











Problem Status Onset


 


Cholangiocarcinoma Acute  











Condition: CRITICAL


Forms:  AppsFunder (English)

## 2018-06-06 NOTE — CARD
--------------- APPROVED REPORT --------------





EKG Measurement

Heart [id number]

AK [id number]

[id number]

[id number]

[id number]



<Conclusion>

Sinus tachycardia

NSSTW changes

## 2018-06-06 NOTE — PCM.SEPTIC
Sepsis Progress Note





- Reassessment Type


Reassessment Type: Non-invasive reassessment





- Non Invasive Reassessment


Were the most recent vital sign reviewed: Yes


Vital Sign (Latest): 


 











Temp Pulse Resp BP Pulse Ox


 


 99.7 F H  114 H  16   113/55 L  99 


 


 06/06/18 16:41  06/06/18 16:41  06/06/18 16:41  06/06/18 16:41  06/06/18 16:41








Cardiovascular: Yes: Tachycardia


Respiratory: Yes: Normal Breath Sounds


Capillary Refill: Delayed


Pulses: Decreased Radial, Decreased Dorsalis Pedis, Decreased Posterior Tibialis


Skin: Pale

## 2018-06-06 NOTE — CT
PROCEDURE:  CT Abdomen and Pelvis without intravenous contrast



HISTORY:

worsening diarrhea x7 days, assess for collitis



COMPARISON:

Noncontrast abdomen pelvis CT examination 12/30/2016.



TECHNIQUE:

Helical CT of the abdomen and pelvis was performed without oral or 

intravenous contrast as per referring physician request. 



Contrast dose: None



Radiation dose:



Total exam DLP = 395.33 mGy-cm.



This CT exam was performed using one or more of the following dose 

reduction techniques: Automated exposure control, adjustment of the 

mA and/or kV according to patient size, and/or use of iterative 

reconstruction technique.



FINDINGS:

Lack of intravenous and oral contrast agents significantly limits 

interpretation. 



LOWER THORAX:

Limited bilateral basilar patchy and linear atelectasis with 

underlying fibrosis not completely excluded. No pleural or 

pericardial effusion. Mild cardiomegaly identified. 



LIVER:

Marked intrahepatic biliary dilatation is suggested, greater the left 

and right lobes liver with underlying hepatic lesions not excluded, 

particularly at the central liver and at the watershed zone between 

the left and right lobes anteriorly. A lengthy biliary stents 

identified from the level of the duodenum up to the main hepatic duct 

and possibly the main right lobe duct. Postop changes are identified 

at the right lobe liver once again.  



GALLBLADDER AND BILE DUCTS:

Prior cholecystectomy. Persistent stranding seen in the gallbladder 

in local mesenteric fat. 



PANCREAS:

Unremarkable. No gross lesion or ductal dilatation.



SPLEEN:

Unremarkable. 



ADRENALS:

Unremarkable. No mass. 



KIDNEYS AND URETERS:

Unremarkable. No hydronephrosis. No solid mass. 



VASCULATURE:

Unremarkable. No aortic aneurysm. 



BOWEL:

The stomach is only minimally distended with fluid and retained air. 

The bowel is not appear obstructed however there is right greater 

than left pericolic reaction and mural thickening follows a similar 

pattern greater the right than left colon segments with the 

transverse colon collapsed but likely significantly affected as well. 

The distal rectosigmoid follows the left hemicolon and does not 

appear tremendously thickened. There is mild ascites distributed in 

the perihepatic perisplenic spaces well as the bilateral pericolic 

gutters and the inferior dependent pelvis.  Pattern suspicious for 

segmental or potential near pan colitis. No abscess or free air is 

grossly evident. The cecum appears somewhat ectopic and is at the mid 

right flank anteriorly. No free intraperitoneal gas collection.



APPENDIX:

Not identified. Sinusitis is not favored difficult to evaluate for in 

this exam. 



PERITONEUM:

Please see bowel section above. 



LYMPH NODES:

Shotty retroperitoneal lymph nodes are identified without gross 

enlargement. 



BLADDER:

Unremarkable. 



REPRODUCTIVE:

Unremarkable. 



BONES:

No acute fracture. 



OTHER FINDINGS:

None.



IMPRESSION:

1. Interval finding suspicious for at least segmental colitis 

affecting the proximal and mid large-bowel with the left hemicolon 

borderline affected.  Pancolitis not excluded completely. Mesenteric 

reaction is seen local to the colon with mild abdominal and pelvic 

ascites present. The lack of oral and intravenous contrast limits 

evaluation No definite free air or abscess appreciable grossly. 



2. Gross intrahepatic biliary dilatation with lengthy Wallstent 

radiating at either the main hepatic duct or the main right lobe 

hepatic duct and extending to duodenum. The lack of intravenous 

contrast limits the evaluation.



3. Prior cholecystectomy.

## 2018-06-07 LAB
ALBUMIN SERPL-MCNC: 2.2 G/DL (ref 3–4.8)
ALBUMIN SERPL-MCNC: 2.5 G/DL (ref 3–4.8)
ALBUMIN/GLOB SERPL: 0.7 {RATIO} (ref 1.1–1.8)
ALBUMIN/GLOB SERPL: 0.7 {RATIO} (ref 1.1–1.8)
ALT SERPL-CCNC: 34 U/L (ref 7–56)
ALT SERPL-CCNC: 40 U/L (ref 7–56)
AST SERPL-CCNC: 30 U/L (ref 14–36)
AST SERPL-CCNC: 39 U/L (ref 14–36)
BASE EXCESS BLDV CALC-SCNC: -3.4 MMOL/L (ref 0–2)
BASE EXCESS BLDV CALC-SCNC: -3.6 MMOL/L (ref 0–2)
BASOPHILS # BLD AUTO: 0.01 K/MM3 (ref 0–2)
BASOPHILS NFR BLD: 0.9 % (ref 0–3)
BUN SERPL-MCNC: 13 MG/DL (ref 7–21)
BUN SERPL-MCNC: 14 MG/DL (ref 7–21)
CALCIUM SERPL-MCNC: 7.6 MG/DL (ref 8.4–10.5)
CALCIUM SERPL-MCNC: 7.9 MG/DL (ref 8.4–10.5)
EOSINOPHIL # BLD: 0 10*3/UL (ref 0–0.7)
EOSINOPHIL NFR BLD: 0.9 % (ref 1.5–5)
ERYTHROCYTE [DISTWIDTH] IN BLOOD BY AUTOMATED COUNT: 19 % (ref 11.5–14.5)
GFR NON-AFRICAN AMERICAN: > 60
GFR NON-AFRICAN AMERICAN: > 60
GRANULOCYTES # BLD: 0.1 10*3/UL (ref 1.4–6.5)
GRANULOCYTES NFR BLD: 9.3 % (ref 50–68)
HGB BLD-MCNC: 8 G/DL (ref 12–16)
LYMPHOCYTES # BLD: 0.3 10*3/UL (ref 1.2–3.4)
LYMPHOCYTES NFR BLD AUTO: 30.6 % (ref 22–35)
MCH RBC QN AUTO: 32 PG (ref 25–35)
MCHC RBC AUTO-ENTMCNC: 34 G/DL (ref 31–37)
MCV RBC AUTO: 94 FL (ref 80–105)
MONOCYTES # BLD AUTO: 0.6 10*3/UL (ref 0.1–0.6)
MONOCYTES NFR BLD: 58.3 % (ref 1–6)
PH BLDV: 7.39 [PH] (ref 7.32–7.43)
PH BLDV: 7.41 [PH] (ref 7.32–7.43)
PLATELET # BLD: 38 10^3/UL (ref 120–450)
PMV BLD AUTO: 9.7 FL (ref 7–11)
RBC # BLD AUTO: 2.5 10^6/UL (ref 3.5–6.1)
VENOUS BLOOD FIO2: 21 %
VENOUS BLOOD FIO2: 21 %
VENOUS BLOOD GAS PCO2: 32 (ref 40–60)
VENOUS BLOOD GAS PCO2: 34 (ref 40–60)
VENOUS BLOOD GAS PO2: 137 MM/HG (ref 30–55)
VENOUS BLOOD GAS PO2: 68 MM/HG (ref 30–55)
WBC # BLD AUTO: 1.1 10^3/UL (ref 4.5–11)

## 2018-06-07 PROCEDURE — 6A550Z2 PHERESIS OF PLATELETS, SINGLE: ICD-10-PCS | Performed by: INTERNAL MEDICINE

## 2018-06-07 PROCEDURE — 30233N1 TRANSFUSION OF NONAUTOLOGOUS RED BLOOD CELLS INTO PERIPHERAL VEIN, PERCUTANEOUS APPROACH: ICD-10-PCS | Performed by: INTERNAL MEDICINE

## 2018-06-07 RX ADMIN — VANCOMYCIN HYDROCHLORIDE SCH MLS/HR: 1 INJECTION, POWDER, LYOPHILIZED, FOR SOLUTION INTRAVENOUS at 16:15

## 2018-06-07 RX ADMIN — NYSTATIN SCH ML: 100000 SUSPENSION ORAL at 18:00

## 2018-06-07 RX ADMIN — MEROPENEM SCH MLS/HR: 1 INJECTION INTRAVENOUS at 22:27

## 2018-06-07 RX ADMIN — NYSTATIN SCH ML: 100000 SUSPENSION ORAL at 11:00

## 2018-06-07 RX ADMIN — VANCOMYCIN HYDROCHLORIDE SCH MG: 500 INJECTION, POWDER, LYOPHILIZED, FOR SOLUTION INTRAVENOUS at 14:07

## 2018-06-07 RX ADMIN — DEXTROSE AND SODIUM CHLORIDE SCH MLS/HR: 5; 900 INJECTION, SOLUTION INTRAVENOUS at 22:31

## 2018-06-07 RX ADMIN — METRONIDAZOLE SCH MLS/HR: 500 INJECTION, SOLUTION INTRAVENOUS at 07:01

## 2018-06-07 RX ADMIN — MEROPENEM SCH MLS/HR: 1 INJECTION INTRAVENOUS at 13:18

## 2018-06-07 RX ADMIN — METRONIDAZOLE SCH MLS/HR: 500 INJECTION, SOLUTION INTRAVENOUS at 13:19

## 2018-06-07 RX ADMIN — NYSTATIN SCH ML: 100000 SUSPENSION ORAL at 22:28

## 2018-06-07 RX ADMIN — DEXTROSE AND SODIUM CHLORIDE SCH MLS/HR: 5; 900 INJECTION, SOLUTION INTRAVENOUS at 01:36

## 2018-06-07 RX ADMIN — NYSTATIN SCH ML: 100000 SUSPENSION ORAL at 18:04

## 2018-06-07 RX ADMIN — VANCOMYCIN HYDROCHLORIDE SCH MLS/HR: 1 INJECTION, POWDER, LYOPHILIZED, FOR SOLUTION INTRAVENOUS at 04:04

## 2018-06-07 RX ADMIN — NYSTATIN SCH ML: 100000 SUSPENSION ORAL at 15:00

## 2018-06-07 RX ADMIN — VANCOMYCIN HYDROCHLORIDE SCH MG: 500 INJECTION, POWDER, LYOPHILIZED, FOR SOLUTION INTRAVENOUS at 18:04

## 2018-06-07 RX ADMIN — METRONIDAZOLE SCH MLS/HR: 500 INJECTION, SOLUTION INTRAVENOUS at 00:00

## 2018-06-07 RX ADMIN — METRONIDAZOLE SCH MLS/HR: 500 INJECTION, SOLUTION INTRAVENOUS at 22:31

## 2018-06-07 RX ADMIN — VANCOMYCIN HYDROCHLORIDE SCH MG: 500 INJECTION, POWDER, LYOPHILIZED, FOR SOLUTION INTRAVENOUS at 22:30

## 2018-06-07 NOTE — CON
DATE:



LOCATION:  Seen in the ICU at the request of Dr. Milian.



HISTORY OF PRESENT ILLNESS:  Odette Ricci is a 73-year-old who had an

attempted Whipple that was unsuccessful, had a stent placed in [facility] and

presently is admitted for increasing abdominal pain.



The vital signs at the moment unremarkable.  She is afebrile, blood

pressure in the 100s, pulse in the 90s, 132 pounds.



White count is low at 1.1; platelets 38, manual 30.  PT is 23, PTT is 31. 

Bilirubin is 7.5.  SGOT is 39, alk phos slightly elevated at 137.  CAT scan

is reviewed with a stent in place in the liver with dilated hepatic

radicals, possibly some tumor, ascites, and some pleural effusions but

other than that, nothing remarkable.



I examined the patient, had mild tenderness throughout without guarding or

rebound.  CAT scan is reviewed, showed colitis, which is probably related

to the post-chemotherapy treatment about a week ago.



Discussed with the resident and we agree.  I examined the patient myself. 

The plan will be conservative management with IV antibiotics and fluids

without surgical intervention.  I will follow the patient peripherally, but

my feeling is that the consult for palliative care and/or hospice is

reasonable and I will follow peripherally.







__________________________________________

Serge Ford MD



DD:  06/07/2018 13:52:01

DT:  06/07/2018 21:32:25

Job # [id number]

## 2018-06-07 NOTE — CP.PCM.PN
Subjective





- Date & Time of Evaluation


Date of Evaluation: 06/07/18


Time of Evaluation: 15:40





- Subjective


Subjective: 





feels somewhat better today, denies chest or abd pain, reports pain in mouth





Objective





- Vital Signs/Intake and Output


Vital Signs (last 24 hours): 


 











Temp Pulse Resp BP Pulse Ox


 


 97.7 F   91 H  23   100/55 L  98 


 


 06/07/18 06:00  06/07/18 13:00  06/07/18 13:00  06/07/18 13:00  06/07/18 13:00








Intake and Output: 


 











 06/07/18 06/07/18





 06:59 18:59


 


Intake Total 3520 


 


Balance 3520 














- Medications


Medications: 


 Current Medications





Al Hydrox/Mg Hydrox/Simethicone 30 ml/Diphenhydramine HCl 75 mg/Lidocaine 30 ml

  0 ml PO Q2H PRN


   PRN Reason: Mouth/Throat Pain


   Last Admin: 06/07/18 04:04 Dose:  30 ml


Vancomycin HCl (Vancomycin 1gm)  1 gm in 250 mls @ 167 mls/hr IVPB Q12H EDISON


   PRN Reason: Protocol


   Last Admin: 06/07/18 04:04 Dose:  167 mls/hr


Metronidazole (Flagyl)  500 mg in 100 mls @ 100 mls/hr IVPB Q8 EDISON


   PRN Reason: Protocol


   Last Admin: 06/07/18 13:19 Dose:  100 mls/hr


Dextrose/Sodium Chloride (Dextrose 5%/0.9% Ns 1000 Ml)  1,000 mls @ 100 mls/hr 

IV .Q10H [facility]


   Last Admin: 06/07/18 01:36 Dose:  100 mls/hr


Meropenem (Merrem Iv 1 Gm Premix)  50 mls @ 100 mls/hr IVPB Q8 EDISON


   PRN Reason: Protocol


   Stop: 06/14/18 06:31


   Last Admin: 06/07/18 13:18 Dose:  100 mls/hr


Potassium Phosphate 9.75 mmole (/ Sodium Chloride)  253.25 mls @ 42.5 mls/hr 

IVPB ONCE ONE


   Stop: 06/07/18 22:27


Nystatin (Nystatin Oral Susp)  5 ml PO QID [facility]


   Last Admin: 06/07/18 11:00 Dose:  5 ml


Pantoprazole Sodium (Protonix Inj)  40 mg IVP DAILY [facility]


   Last Admin: 06/07/18 12:54 Dose:  40 mg


Vancomycin HCl (Vancocin 25 Mg/Ml (Oral Use))  125 mg PO QID EDISON


   PRN Reason: Protocol


   Last Admin: 06/07/18 14:07 Dose:  125 mg











- Labs


Labs: 


 





 06/07/18 07:00 





 06/07/18 08:15 





 











PT  23.1 SECONDS (9.4-12.5)  H  06/06/18  13:25    


 


INR  2.00  (0.93-1.08)  H  06/06/18  13:25    


 


APTT  31.9 Seconds (25.1-36.5)   06/06/18  13:25    














- Respiratory Exam


Respiratory Exam: Clear to Ausculation Bilateral, Wheezes





- Cardiovascular Exam


Cardiovascular Exam: REGULAR RHYTHM





- GI/Abdominal Exam


GI & Abdominal Exam: Soft, Normal Bowel Sounds





- Extremities Exam


Extremities Exam: Pedal Edema





- Neurological Exam


Neurological Exam: Alert, Awake





Assessment and Plan


(1) Cholangiocarcinoma


Status: Acute   





(2) Neutropenic fever


Status: Acute   





(3) Abdominal pain


Status: Acute   





- Assessment and Plan (Free Text)


Plan: 





continue IV Abx, monitor blood counts, on neutropenic precautions, prognosis 

remains very poor

## 2018-06-07 NOTE — PN
DATE:  06/07/2018



SUBJECTIVE:  The patient is seen and examined at bedside.  She is

comfortable.  She denies any pain and her night was uneventful.  However,

she had a lot of diarrhea still.



PHYSICAL EXAMINATION:

VITAL SIGNS:  Blood pressure 98/45 with mean arterial pressure 67, heart

rate 100, oxygen saturation 97%, respiratory rate 19.

ENT:  Head and neck atraumatic.

LUNGS:  Clear to auscultation bilaterally.

HEART:  Regular rate and rhythm.  S1, S2 normal.

ABDOMEN:  Soft, nontender, nondistended.

MUSCULOSKELETAL:  No C/C/E.

NEUROLOGICAL:  The patient moves all extremities spontaneously.

SKIN:  Moist.

PSYCHIATRIC:  The patient is alert, awake and oriented x3.



LABORATORY DATA:  WBC 1.1; hemoglobin 8 and relatively stable; platelet

count 38, up from 31.



MEDICATIONS:  D5 normal saline at 100 mL per hour, Flagyl, meropenem,

Protonix, Granix, vancomycin.



ASSESSMENT AND PLAN:  This 73-year-old lady with history of ulcerative

colitis, sclerosing cholangitis, and cholangiocarcinoma with metastases to

the liver who presented to [facility] with neutropenic fever

and diarrhea approximately 1 week after receiving last cycle of chemotherapy. 

The patient was given a liter of normal saline as a bolus and D5 normal

saline continued as maintenance fluid at 100 mL/hour.  Stool was ordered,

but was unable to get for C diff assay.  Nevertheless, the patient was

started on Flagyl and broad-spectrum antibiotics.  Septic workup initiated.

Blood and urine culture were sent.  Procalcitonin is pending.  The patient

continued to have diarrhea.  The patient was started on Granix with white

cell count somewhat improved compared with yesterday.  The patient is

afebrile now; however, overnight T-max was 101.7.



We will continue to target euvolemia, euglycemia, normothermia and oxygen

saturation more than 90%.  We will continue with DVT and GI prophylaxis.



Addendum: despite fluctuating BPs, diarrhea remarkably slowed down as per 
patient, only once today. received 2L bolus, subjectively is doing better



ccm time 40 min







__________________________________________

Samir Reid MD

DD:  06/07/2018 8:16:06

DT:  06/07/2018 8:21:59

Job # [id number]



MTDRIC

## 2018-06-07 NOTE — HP
HISTORY OF PRESENT ILLNESS:  The patient is a 73-year-old female with a

history of metastatic cholangiocarcinoma status post biliary stent

placement by Dr. Diamond at [facility] on 04/18/2018.  Currently receiving

chemotherapy by Dr. Romano.  Presents to the emergency department today

with progressive lethargy, weakness and jaundice.  The patient is noted to

be pancytopenic with a coagulopathy and is admitted to the intensive care

unit for further evaluation and management.  The patient also complains of

nausea, diarrhea, and stomatitis.  There is no melena, no bright red blood

per rectum.  No fevers, chills or headache.



PAST MEDICAL HISTORY:  Includes history of colitis, asthma, fibromyalgia. 

There is no history of diabetes, hypertension or heart disease.



PAST SURGICAL HISTORY:  Significant for multiple sinus surgeries or chronic

sinusitis.



CURRENT MEDICATIONS:  The patient is on no current medications other than

chemotherapy.



ALLERGIES:  SHE HAS ALLERGIES TO ASPIRIN AND IBUPROFEN.



SOCIAL HISTORY:  The patient denies any history of alcohol, tobacco or drug

use.



FAMILY HISTORY:  Noncontributory.



REVIEW OF SYSTEMS:  As above.



PHYSICAL EXAMINATION:

GENERAL:  The patient is a cachectic female, in no acute distress.

VITAL SIGNS:  Blood pressure 113/55, pulse 114, temperature 99.7, T-max

100.9, respiratory rate 16.

HEENT:  Head is normocephalic, atraumatic.  There is bilateral temporal

wasting and scleral icterus.

NECK:  Supple.  No thyromegaly.  No carotid bruit.

LUNGS:  Show few scattered crepitations.

HEART:  Regular rate and rhythm.

ABDOMEN:  Soft with mild epigastric tenderness to palpation with no

guarding or rebound.  Bowel sounds are normoactive.

EXTREMITIES:  Show no cyanosis or clubbing.  There is bilateral 1 to 2+

anasarca.  There is bilateral muscle wasting and atrophy.

NEUROLOGIC:  The patient is awake and oriented without focal sensory or

motor deficits.

SKIN:  Jaundiced.



LABORATORY DATA:  WBC 0.9, hemoglobin 8.1, hematocrit 23.8, platelets of

31.  Prothrombin time is 23.1.  Fibrinogen is 358.  Chemistry; sodium 134,

potassium 3.8, chloride 104, CO2 of 21, BUN 14, creatinine 0.5, calcium 8,

bilirubin 7.8, alkaline phosphatase 187, AST 43, .  Troponin is less

than 0.01.  Albumin is 2.3.



IMAGING STUDIES:  Chest x-ray shows no active disease.  CT scan of the

abdomen and pelvis shows possible colitis with intrahepatic biliary

dilatation, stent is present and history of cholecystectomy in the past.



IMPRESSION:

1.  Pancytopenia, status post chemotherapy, rule out sepsis, rule out

disseminated intravascular coagulation.

2.  Metastatic cholangiocarcinoma, status post biliary stent placement.



PLAN:  The patient will be admitted to the intensive care unit.  We will

obtain Infectious Disease consultation with Dr. Sarmiento for intravenous

antibiotics.  Routine cultures.  We will obtain Hematology/Oncology

consultation by Dr. Romano.  Prognosis is very poor.  Family is aware and

will advise regarding advance directives.





__________________________________________

BULMARO Kramer MD



DD:  06/06/2018 17:48:32

DT:  06/06/2018 17:51:46

Job # [id number]

## 2018-06-07 NOTE — CP.PCM.PN
Subjective





- Date & Time of Evaluation


Date of Evaluation: 06/07/18


Time of Evaluation: 07:50





- Subjective


Subjective: 





Patient seen and examined this AM. patient complains of persistent abdominal 

"soreness" but denies nausea and vomiting. Patient is having blood administered 

for low hemoglobin





Objective





- Vital Signs/Intake and Output


Vital Signs (last 24 hours): 


 











Temp Pulse Resp BP Pulse Ox


 


 97.7 F   95 H  16   113/51 L  98 


 


 06/07/18 06:00  06/07/18 06:40  06/07/18 06:40  06/07/18 06:30  06/07/18 06:40








Intake and Output: 


 











 06/07/18 06/07/18





 06:59 18:59


 


Intake Total 3520 


 


Balance 3520 














- Medications


Medications: 


 Current Medications





Al Hydrox/Mg Hydrox/Simethicone 30 ml/Diphenhydramine HCl 75 mg/Lidocaine 30 ml

  0 ml PO Q2H PRN


   PRN Reason: Mouth/Throat Pain


   Last Admin: 06/07/18 04:04 Dose:  30 ml


Vancomycin HCl (Vancomycin 1gm)  1 gm in 250 mls @ 167 mls/hr IVPB Q12H [facility]


   PRN Reason: Protocol


   Last Admin: 06/07/18 04:04 Dose:  167 mls/hr


Metronidazole (Flagyl)  500 mg in 100 mls @ 100 mls/hr IVPB Q8 [facility]


   PRN Reason: Protocol


   Last Admin: 06/07/18 07:01 Dose:  100 mls/hr


Dextrose/Sodium Chloride (Dextrose 5%/0.9% Ns 1000 Ml)  1,000 mls @ 100 mls/hr 

IV .Q10H [facility]


   Last Admin: 06/07/18 01:36 Dose:  100 mls/hr


Meropenem (Merrem Iv 1 Gm Premix)  50 mls @ 100 mls/hr IVPB Q8 [facility]


   PRN Reason: Protocol


   Stop: 06/14/18 06:31


Nystatin (Nystatin Oral Susp)  5 ml PO QID [facility]


   Last Admin: 06/06/18 22:30 Dose:  5 ml


Pantoprazole Sodium (Protonix Inj)  40 mg IVP DAILY [facility]











- Labs


Labs: 


 





 06/07/18 07:00 





 06/07/18 08:15 





 











PT  23.1 SECONDS (9.4-12.5)  H  06/06/18  13:25    


 


INR  2.00  (0.93-1.08)  H  06/06/18  13:25    


 


APTT  31.9 Seconds (25.1-36.5)   06/06/18  13:25    














- Constitutional


Appears: No Acute Distress, Cachectic, Chronically Ill





- Head Exam


Head Exam: ATRAUMATIC, NORMOCEPHALIC





- Eye Exam


Eye Exam: EOMI, Scleral icterus





- ENT Exam


ENT Exam: Mucous Membranes Moist, Normal Oropharynx





- Respiratory Exam


Respiratory Exam: NORMAL BREATHING PATTERN.  absent: Accessory Muscle Use, 

Respiratory Distress





- Cardiovascular Exam


Cardiovascular Exam: RRR





- GI/Abdominal Exam


GI & Abdominal Exam: Distended, Tenderness (mild diffuse tenderness).  absent: 

Rigid, Soft, Rebound





- Extremities Exam


Extremities Exam: absent: Calf Tenderness, Tenderness





- Neurological Exam


Neurological Exam: Alert, Awake, Oriented x3





- Psychiatric Exam


Psychiatric exam: Normal Affect, Normal Mood





- Skin


Skin Exam: Dry, Warm


Additional comments: 





jaundiced





Assessment and Plan





- Assessment and Plan (Free Text)


Assessment: 





73F with cholangiocarcinoma and abdominal pain


Plan: 





No surgical intervention indicated at this time--patient has advanced malignant 

disease which is likely contributing to her abdominal pain. Patient is 

currently hemodynamically stable and a very poor surgical candidate, and 

symptoms are improving


Continue management per the ICU and ID


PRN pain and nausea medication


Please contact surgical team for any further questions or concerns





Discussed with Dr. Logan Wynne, PGY2

## 2018-06-07 NOTE — CP.PCM.CON
History of Present Illness





- History of Present Illness


History of Present Illness: 


73 year old female with PMH of primary sclerosing cholangitis with liver 

cirrhosis S/P ERCP and biliary stent placement, cholangiocarcinoma S/P 

cholecystectomy and on chemotherapy last session 2 weeks ago, Ulcerative colitis

, history of MSSA sinusitis, COPD, allergic rhinitis, giant cell arteritis came 

in to [facility] because of generalized weakness, nausea and watery diarrhea for the 

past week. She has subjective fevers intermittently, no headache or dizziness, 

no sore throat, no rhinorrhea, no cough or SOB, no chest pain, has abdominal 

discomfort, no dysuria, no pain at the port-a-cath site. In the ED, CT scan of 

the abdomen and pelvis showed pancolitis. She is also noted to be neutropenic 

and also had fever noted in the ED. Infectious Diseases consult is requested to 

further evaluate and manage.





Review of Systems





- Review of Systems


All systems: reviewed and no additional remarkable complaints except (as per HPI

)





Past Patient History





- Infectious Disease


Hx of Infectious Diseases: None





- Tetanus Immunizations


Tetanus Immunization: Unknown





- Past Social History


Smoking Status: Never Smoked





- CARDIAC


Hx Pacemaker: No





- PULMONARY


Hx Respiratory Disorders: Yes


Hx Asthma: Yes


Hx Chronic Obstructive Pulmonary Disease (COPD): Yes





- NEUROLOGICAL


Hx Neurological Disorder: Yes


Hx Migraine: Yes





- HEENT


Hx HEENT Problems: Yes


Hx Deafness: Yes (left)





- RENAL


Hx Chronic Kidney Disease: No





- ENDOCRINE/METABOLIC


Other/Comment: Stage 4 biliary CA





- HEMATOLOGICAL/ONCOLOGICAL


Hx Cancer: Yes (STAGE 4 BILIARY CA)


Hx Hepatitis A: Yes





- INTEGUMENTARY


Hx Dermatological Problems: No





- MUSCULOSKELETAL/RHEUMATOLOGICAL


Hx Musculoskeletal Disorders: Yes





- GASTROINTESTINAL


Hx Gastrointestinal Disorders: Yes


Other/Comment: BILIARY CA-STAGE 4.  ULCERATIVE COLOITIS





- GENITOURINARY/GYNECOLOGICAL


Hx Genitourinary Disorders: Yes


Hx Hematuria: Yes





- PSYCHIATRIC


Hx Psychophysiologic Disorder: Yes





- SURGICAL HISTORY


Hx Surgeries: Yes (SINUS SURGERIES X'S 4)


Hx Cardiac Catheterization: Yes (4/18)


Other/Comment: sinus sx 4x





- ANESTHESIA


Hx Anesthesia: Yes


Hx Anesthesia Reactions: No


Hx Malignant Hyperthermia: No





Meds


Allergies/Adverse Reactions: 


 Allergies











Allergy/AdvReac Type Severity Reaction Status Date / Time


 


aspirin Allergy  ANAPHYLAXIS Verified 04/24/18 10:13


 


ibuprofen Allergy  ANAPHYLAXIS Verified 04/24/18 10:13














- Medications


Medications: 


 Current Medications





Al Hydrox/Mg Hydrox/Simethicone 30 ml/Diphenhydramine HCl 75 mg/Lidocaine 30 ml

  0 ml PO Q2H PRN


   PRN Reason: Mouth/Throat Pain


   Last Admin: 06/07/18 04:04 Dose:  30 ml


Vancomycin HCl (Vancomycin 1gm)  1 gm in 250 mls @ 167 mls/hr IVPB Q12H [facility]


   PRN Reason: Protocol


   Last Admin: 06/07/18 04:04 Dose:  167 mls/hr


Metronidazole (Flagyl)  500 mg in 100 mls @ 100 mls/hr IVPB Q8 [facility]


   PRN Reason: Protocol


   Last Admin: 06/07/18 00:00 Dose:  100 mls/hr


Dextrose/Sodium Chloride (Dextrose 5%/0.9% Ns 1000 Ml)  1,000 mls @ 100 mls/hr 

IV .Q10H [facility]


   Last Admin: 06/07/18 01:36 Dose:  100 mls/hr


Meropenem (Merrem Iv 1 Gm Premix)  50 mls @ 100 mls/hr IVPB Q8 [facility]


   PRN Reason: Protocol


   Stop: 06/14/18 06:31


Nystatin (Nystatin Oral Susp)  5 ml PO QID [facility]


   Last Admin: 06/06/18 22:30 Dose:  5 ml











Physical Exam





- Constitutional


Appears: Cachectic, Chronically Ill





- Head Exam


Head Exam: NORMAL INSPECTION





- Neck Exam


Neck exam: Negative for: Meningismus





- Respiratory Exam


Respiratory Exam: Decreased Breath Sounds


Additional comments: 





right anterior chest wall port-a-cath in place





- Cardiovascular Exam


Cardiovascular Exam: +S1, +S2





- GI/Abdominal Exam


GI & Abdominal Exam: Soft.  absent: Tenderness





Results





- Vital Signs


Recent Vital Signs: 


 Last Vital Signs











Temp  98.2 F   06/07/18 02:40


 


Pulse  96 H  06/07/18 02:50


 


Resp  19   06/07/18 02:50


 


BP  99/37 L  06/07/18 02:30


 


Pulse Ox  99   06/07/18 02:50














- Labs


Result Diagrams: 


 06/07/18 07:00





 06/07/18 08:15


Labs: 


 Laboratory Results - last 24 hr











  06/06/18 06/06/18 06/07/18





  19:18 21:48 02:40


 


Sodium  135  


 


Potassium  3.7  


 


Chloride  105  


 


Carbon Dioxide  20 L  


 


Anion Gap  14  


 


BUN  14  


 


Creatinine  0.5 L  


 


Est GFR ( Amer)  > 60  


 


Est GFR (Non-Af Amer)  > 60  


 


POC Glucose (mg/dL)   81  68


 


Random Glucose  94  


 


Calcium  7.7 L  


 


Magnesium  1.7  


 


Total Bilirubin  7.9 H  


 


AST  39 H  


 


ALT  49  


 


Alkaline Phosphatase  170 H  


 


Total Protein  5.8  


 


Albumin  2.2 L  


 


Globulin  3.5  


 


Albumin/Globulin Ratio  0.6 L  














  06/07/18





  06:04


 


Sodium 


 


Potassium 


 


Chloride 


 


Carbon Dioxide 


 


Anion Gap 


 


BUN 


 


Creatinine 


 


Est GFR ( Amer) 


 


Est GFR (Non-Af Amer) 


 


POC Glucose (mg/dL)  73


 


Random Glucose 


 


Calcium 


 


Magnesium 


 


Total Bilirubin 


 


AST 


 


ALT 


 


Alkaline Phosphatase 


 


Total Protein 


 


Albumin 


 


Globulin 


 


Albumin/Globulin Ratio 














Assessment & Plan





- Assessment and Plan (Free Text)


Plan: 





Assessment


Severe Sepsis with febrile neutropenia, due to pancolitis, R/O bacteremia from 

port


history of sepsis from biliary tree as the source in a patient with primary 

sclerosing cholangitis with liver cirrhosis S/P ERCP and biliary stent 

placement - S/P biopsy of the enlarged lymph nodes adjacent to the common bile 

duct


cholangiocarcinoma on chemotherapy


S/P cholecystectomy


Ulcerative colitis


history of MSSA sinusitis


COPD


allergic rhinitis


giant cell arteritis





Plan


started patient on Vancomycin, Merrem pending blood, stool cx, stool for C. 

diff and start PO Vancomycin and IV Flagyl


will monitor clinically


overall prognosis is poor


discussed with Dr. Valencia

## 2018-06-07 NOTE — CP.PCM.CON
History of Present Illness





- History of Present Illness


History of Present Illness: 





Palliative consult requested by family


copied to Dr. AVLIN Milian


Reason: Goals of care





73 year old female with history of cholangiocarcinoma,last chemotherapy one 

week ago. She presented on 6/618 with diarrhea of seven day duration. She 

denied melena, nausea, vomitingchest pain, shortness of breath, hemoptysis or 

syncope. She was found to be pancytopenic. CT of abdomen/pelvis showed 

segmental colitis affecting  the proximal and mid large bowel and left left 

hemicolon borderline, no abscess or free air, gross intrahepatic billiary 

dilatation with lengthy Wallstent radiating at either the main hepatic duct or 

the main right lobe hepatic duct and extending into the duodenum, prior 

cholecystectomy.Chest x ray no active disease.








Labs WBC 1.1, Hgb 8.0, Plt 38, , K 3.7, BUN 14, Creat. 0.5, Adan 7.7, 

Total Bili 7.9, AST 39, Alk Phos 170, , Albumin 2.2, hepaits screening 

negative, venous lactate





Vital Signs: P 95, /50, R 16, O2 sat 98%





PMHx: ulcerative colitis, sclerosing cholangitis, metastatic cholangiocarcinoma

, COPD, giant cell arteritis, migraines,rheumatoid arthritis, osteoarthritis, 

fibromyalgia, asthma.





Social History: Former smoker, no alcohol or drug use. Lives independently.





Family History: Non contributory.





Advance Care Planning: The patient does not have an Advanced Directive. He 

daughter Aroldo Hatch is POA.





Review of Systems: As per HPI, 12 point review negative








Past Patient History





- Infectious Disease


Hx of Infectious Diseases: None





- Tetanus Immunizations


Tetanus Immunization: Unknown





- Past Social History


Smoking Status: Never Smoked





- CARDIAC


Hx Pacemaker: No





- PULMONARY


Hx Respiratory Disorders: Yes


Hx Asthma: Yes


Hx Chronic Obstructive Pulmonary Disease (COPD): Yes





- NEUROLOGICAL


Hx Neurological Disorder: Yes


Hx Migraine: Yes





- HEENT


Hx HEENT Problems: Yes


Hx Deafness: Yes (left)





- RENAL


Hx Chronic Kidney Disease: No





- ENDOCRINE/METABOLIC


Other/Comment: Stage 4 biliary CA





- HEMATOLOGICAL/ONCOLOGICAL


Hx Cancer: Yes (STAGE 4 BILIARY CA)


Hx Hepatitis A: Yes





- INTEGUMENTARY


Hx Dermatological Problems: No





- MUSCULOSKELETAL/RHEUMATOLOGICAL


Hx Musculoskeletal Disorders: Yes





- GASTROINTESTINAL


Hx Gastrointestinal Disorders: Yes


Other/Comment: BILIARY CA-STAGE 4.  ULCERATIVE COLOITIS





- GENITOURINARY/GYNECOLOGICAL


Hx Genitourinary Disorders: Yes


Hx Hematuria: Yes





- PSYCHIATRIC


Hx Psychophysiologic Disorder: Yes





- SURGICAL HISTORY


Hx Surgeries: Yes (SINUS SURGERIES X'S 4)


Hx Cardiac Catheterization: Yes (4/18)


Other/Comment: sinus sx 4x





- ANESTHESIA


Hx Anesthesia: Yes


Hx Anesthesia Reactions: No


Hx Malignant Hyperthermia: No





Meds


Allergies/Adverse Reactions: 


 Allergies











Allergy/AdvReac Type Severity Reaction Status Date / Time


 


aspirin Allergy  ANAPHYLAXIS Verified 04/24/18 10:13


 


ibuprofen Allergy  ANAPHYLAXIS Verified 04/24/18 10:13














- Medications


Medications: 


 Current Medications





Al Hydrox/Mg Hydrox/Simethicone 30 ml/Diphenhydramine HCl 75 mg/Lidocaine 30 ml

  0 ml PO Q2H PRN


   PRN Reason: Mouth/Throat Pain


   Last Admin: 06/07/18 04:04 Dose:  30 ml


Vancomycin HCl (Vancomycin 1gm)  1 gm in 250 mls @ 167 mls/hr IVPB Q12H EDISON


   PRN Reason: Protocol


   Last Admin: 06/07/18 04:04 Dose:  167 mls/hr


Metronidazole (Flagyl)  500 mg in 100 mls @ 100 mls/hr IVPB Q8 EDISON


   PRN Reason: Protocol


   Last Admin: 06/07/18 07:01 Dose:  100 mls/hr


Dextrose/Sodium Chloride (Dextrose 5%/0.9% Ns 1000 Ml)  1,000 mls @ 100 mls/hr 

IV .Q10H EDISON


   Last Admin: 06/07/18 01:36 Dose:  100 mls/hr


Meropenem (Merrem Iv 1 Gm Premix)  50 mls @ 100 mls/hr IVPB Q8 EDISON


   PRN Reason: Protocol


   Stop: 06/14/18 06:31


Sodium Chloride (Sodium Chloride 0.9%)  1,000 mls @ 999 mls/hr IV .Q1H1M STA


   Stop: 06/07/18 09:02


Nystatin (Nystatin Oral Susp)  5 ml PO QID EDISON


   Last Admin: 06/06/18 22:30 Dose:  5 ml


Pantoprazole Sodium (Protonix Inj)  40 mg IVP DAILY EDISON











Physical Exam





- Constitutional


Appears: Chronically Ill





- Head Exam


Head Exam: NORMOCEPHALIC





- Eye Exam


Eye Exam: PERRL, Scleral icterus





- ENT Exam


ENT Exam: Mucous Membranes Moist, Normal Oropharynx





- Neck Exam


Neck exam: Positive for: Normal Inspection





- Respiratory Exam


Respiratory Exam: Decreased Breath Sounds, NORMAL BREATHING PATTERN





- Cardiovascular Exam


Cardiovascular Exam: REGULAR RHYTHM, +S1, +S2





- GI/Abdominal Exam


GI & Abdominal Exam: Distended, Normal Bowel Sounds





- Extremities Exam


Extremities exam: Positive for: pedal edema





- Back Exam


Additional comments: 





vertebral tenderness in lumbar area





- Neurological Exam


Neurological exam: Alert, Oriented x3





- Skin


Skin Exam: Dry, Pallor





- Additional Findings


Additional findings: 





Palliative performance scale rating 50%





Results





- Vital Signs


Recent Vital Signs: 


 Last Vital Signs











Temp  97.7 F   06/07/18 06:00


 


Pulse  95 H  06/07/18 06:40


 


Resp  16   06/07/18 06:40


 


BP  113/51 L  06/07/18 06:30


 


Pulse Ox  98   06/07/18 06:40














- Labs


Result Diagrams: 


 06/08/18 05:40





 06/08/18 08:00


Labs: 


 Laboratory Results - last 24 hr











  06/06/18 06/06/18 06/07/18





  19:18 21:48 02:40


 


WBC   


 


RBC   


 


Hgb   


 


Hct   


 


MCV   


 


MCH   


 


MCHC   


 


RDW   


 


Plt Count   


 


MPV   


 


Gran %   


 


Lymph % (Auto)   


 


Mono % (Auto)   


 


Eos % (Auto)   


 


Baso % (Auto)   


 


Gran #   


 


Lymph # (Auto)   


 


Mono # (Auto)   


 


Eos # (Auto)   


 


Baso # (Auto)   


 


pO2   


 


VBG pH   


 


VBG pCO2   


 


VBG HCO3   


 


VBG Total CO2   


 


VBG O2 Sat (Calc)   


 


VBG Base Excess   


 


VBG Potassium   


 


Glucose   


 


Lactate   


 


FiO2   


 


Sodium  135  


 


Potassium  3.7  


 


Chloride  105  


 


Carbon Dioxide  20 L  


 


Anion Gap  14  


 


BUN  14  


 


Creatinine  0.5 L  


 


Est GFR ( Amer)  > 60  


 


Est GFR (Non-Af Amer)  > 60  


 


POC Glucose (mg/dL)   81  68


 


Random Glucose  94  


 


Calcium  7.7 L  


 


Phosphorus   


 


Magnesium  1.7  


 


Total Bilirubin  7.9 H  


 


AST  39 H  


 


ALT  49  


 


Alkaline Phosphatase  170 H  


 


Total Protein  5.8  


 


Albumin  2.2 L  


 


Globulin  3.5  


 


Albumin/Globulin Ratio  0.6 L  


 


Venous Blood Potassium   














  06/07/18 06/07/18 06/07/18





  06:04 07:00 07:00


 


WBC   1.1 L* D 


 


RBC   2.50 L 


 


Hgb   8.0 L 


 


Hct   23.5 L 


 


MCV   94.0 


 


MCH   32.0 


 


MCHC   34.0 


 


RDW   19.0 H 


 


Plt Count   38 L* 


 


MPV   9.7 


 


Gran %   9.3 L 


 


Lymph % (Auto)   30.6 


 


Mono % (Auto)   58.3 H 


 


Eos % (Auto)   0.9 L 


 


Baso % (Auto)   0.9 


 


Gran #   0.10 L 


 


Lymph # (Auto)   0.3 L 


 


Mono # (Auto)   0.6 


 


Eos # (Auto)   0.0 


 


Baso # (Auto)   0.01 


 


pO2   


 


VBG pH   


 


VBG pCO2   


 


VBG HCO3   


 


VBG Total CO2   


 


VBG O2 Sat (Calc)   


 


VBG Base Excess   


 


VBG Potassium   


 


Glucose   


 


Lactate   


 


FiO2   


 


Sodium   


 


Potassium   


 


Chloride   


 


Carbon Dioxide   


 


Anion Gap   


 


BUN   


 


Creatinine   


 


Est GFR ( Amer)   


 


Est GFR (Non-Af Amer)   


 


POC Glucose (mg/dL)  73  


 


Random Glucose   


 


Calcium   


 


Phosphorus    3.0


 


Magnesium   


 


Total Bilirubin   


 


AST   


 


ALT   


 


Alkaline Phosphatase   


 


Total Protein   


 


Albumin   


 


Globulin   


 


Albumin/Globulin Ratio   


 


Venous Blood Potassium   














  06/07/18 06/07/18





  08:15 08:15


 


WBC  


 


RBC  


 


Hgb  


 


Hct  


 


MCV  


 


MCH  


 


MCHC  


 


RDW  


 


Plt Count  


 


MPV  


 


Gran %  


 


Lymph % (Auto)  


 


Mono % (Auto)  


 


Eos % (Auto)  


 


Baso % (Auto)  


 


Gran #  


 


Lymph # (Auto)  


 


Mono # (Auto)  


 


Eos # (Auto)  


 


Baso # (Auto)  


 


pO2   137 H


 


VBG pH   7.39


 


VBG pCO2   34.0 L


 


VBG HCO3   20.6 L


 


VBG Total CO2   21.6 L


 


VBG O2 Sat (Calc)   100.5 H


 


VBG Base Excess   -3.6 L


 


VBG Potassium   3.3 L


 


Glucose   73


 


Lactate   2.3 H


 


FiO2   21.0


 


Sodium  137  136.0


 


Potassium  3.5 L 


 


Chloride  108 H  109.0 H


 


Carbon Dioxide  19 L 


 


Anion Gap  14 


 


BUN  14 


 


Creatinine  0.5 L 


 


Est GFR ( Amer)  > 60 


 


Est GFR (Non-Af Amer)  > 60 


 


POC Glucose (mg/dL)  


 


Random Glucose  73 


 


Calcium  7.9 L 


 


Phosphorus  


 


Magnesium  


 


Total Bilirubin  7.5 H 


 


AST  39 H 


 


ALT  40 


 


Alkaline Phosphatase  137 H 


 


Total Protein  5.9 


 


Albumin  2.5 L 


 


Globulin  3.4 


 


Albumin/Globulin Ratio  0.7 L 


 


Venous Blood Potassium   3.3 L














Assessment & Plan





- Assessment and Plan (Free Text)


Assessment: 





73 year old female with history of metastatic cholangiocarcinoma, s/p bilary 

stent, s/p chemotherapy one week ago who is admitted with sepsis ulcerate 

colitis, pancytopenia and diarrhea.





The patient is weak, lethargic Complains of low back pain. States she is still 

having diarrhea.





POA/daughter, Aroldo Hernandez contacted via phone. Daughter unable to speak 

with me at this time.  Daughter intends to call me later today for goals of 

care discussion.





I spoke with POA via phone later in the day. She is aware of mother's medical 

status and poor prognosis. Daughter understands the necessity of advance care 

planning. Daughter intends to speak with her mother about resuscitation status. 

Daughter requests that I speak with mother as well regarding advance care 

planningl





Daughter also aware that her mother can no longer live alone. Daughter 

interested in nursing home placement with hospice care. Advised daughter that I 

would have  contact her regarding options for placement.





Time spent in goals of care and advance care planning discussion with daughter, 

30 minutes


Plan: 





Goals of care and advance care planning.





Sepsis: Cultures/ procalcitionin pending. Merrem, Flagyl started





Pancytopenia:  Monitor CBC daily,Granix, transfuse as needed. Neutropenic 

precautions

## 2018-06-07 NOTE — CP.PCM.PN
Subjective





- Date & Time of Evaluation


Date of Evaluation: 06/07/18


Time of Evaluation: 08:00





- Subjective


Subjective: 


GI Progress Note for GENARO Juarez PGY2





Patient seen and examined at bedside. There were no acute overnight events as 

per nursing staff. Patient has been having greenish loose stool. She has mild 

RUQ pain. She denies chest pain, shortness of breath, nausea/vomiting/diarrhea, 

fever or chills. 





Objective





- Vital Signs/Intake and Output


Vital Signs (last 24 hours): 


 











Temp Pulse Resp BP Pulse Ox


 


 97.7 F   95 H  16   113/51 L  98 


 


 06/07/18 06:00  06/07/18 06:40  06/07/18 06:40  06/07/18 06:30  06/07/18 06:40








Intake and Output: 


 











 06/07/18 06/07/18





 06:59 18:59


 


Intake Total 3520 


 


Balance 3520 














- Medications


Medications: 


 Current Medications





Al Hydrox/Mg Hydrox/Simethicone 30 ml/Diphenhydramine HCl 75 mg/Lidocaine 30 ml

  0 ml PO Q2H PRN


   PRN Reason: Mouth/Throat Pain


   Last Admin: 06/07/18 04:04 Dose:  30 ml


Vancomycin HCl (Vancomycin 1gm)  1 gm in 250 mls @ 167 mls/hr IVPB Q12H EDISON


   PRN Reason: Protocol


   Last Admin: 06/07/18 04:04 Dose:  167 mls/hr


Metronidazole (Flagyl)  500 mg in 100 mls @ 100 mls/hr IVPB Q8 EDISON


   PRN Reason: Protocol


   Last Admin: 06/07/18 07:01 Dose:  100 mls/hr


Dextrose/Sodium Chloride (Dextrose 5%/0.9% Ns 1000 Ml)  1,000 mls @ 100 mls/hr 

IV .Q10H EDISON


   Last Admin: 06/07/18 01:36 Dose:  100 mls/hr


Meropenem (Merrem Iv 1 Gm Premix)  50 mls @ 100 mls/hr IVPB Q8 EDISON


   PRN Reason: Protocol


   Stop: 06/14/18 06:31


Nystatin (Nystatin Oral Susp)  5 ml PO QID EDISON


   Last Admin: 06/06/18 22:30 Dose:  5 ml


Pantoprazole Sodium (Protonix Inj)  40 mg IVP DAILY EDISON











- Labs


Labs: 


 





 06/07/18 07:00 





 06/07/18 08:15 





 











PT  23.1 SECONDS (9.4-12.5)  H  06/06/18  13:25    


 


INR  2.00  (0.93-1.08)  H  06/06/18  13:25    


 


APTT  31.9 Seconds (25.1-36.5)   06/06/18  13:25    














- Constitutional


Appears: No Acute Distress





- Head Exam


Head Exam: ATRAUMATIC, NORMAL INSPECTION, NORMOCEPHALIC





- Eye Exam


Eye Exam: Scleral icterus





- Respiratory Exam


Respiratory Exam: Clear to Ausculation Bilateral, NORMAL BREATHING PATTERN.  

absent: Rales, Rhonchi





- Cardiovascular Exam


Cardiovascular Exam: REGULAR RHYTHM, +S1, +S2.  absent: Gallop, Rubs, Murmur





- GI/Abdominal Exam


GI & Abdominal Exam: Soft, Tenderness (mild RUQ/epigastric ), Normal Bowel 

Sounds.  absent: Guarding, Rigid, Rebound





- Neurological Exam


Neurological Exam: Alert, Awake, CN II-XII Intact





- Skin


Skin Exam: Dry, Warm





Assessment and Plan





- Assessment and Plan (Free Text)


Assessment: 


This is a 74yo female with past medical history COPD, giant cell arteritis, 

Ulcerative colitis, cholangiocarcinoma s/p cholecystectomy and chemotherapy 2 

weeks who is admitted 





1. Sepsis with neutropenic fever secondary to pancolitis 


2. Pancytopenia 


3. cholangiocarcinoma on chemotherapy 


4. Primary sclerosing cholangitis 


5. Cirrhosis s/p ERCP with biliary stent 


6. Ulcerative colitis


7. COPD


8. Giant cell arteritis  


Plan: 


Continue IV antibiotics. Neutropenic precautions. ID on consult. Will continue 

to monitor LFTs and CBC. Oncology is on consult. Palliative care is on consult. 

Prognosis is guarded. Diet as tolerated. 





Case seen, reviewed and discussed with Dr. Valencia. 


GENARO Gomez PGY2

## 2018-06-07 NOTE — CON
DATE:  06/06/2018



HISTORY OF PRESENT ILLNESS:  This is a 73-year-old lady with a history of

ulcerative colitis, sclerosing cholangitis, cholangiocarcinoma, status post

cholecystectomy, who presented to [facility] 1 week after last

cycle of chemotherapy with complaint of 7-day duration of diarrhea.  It was

not melena.  The patient also denies nausea or vomiting.  Here in [facility], CTAP showed extensive colitis  A liter of normal saline bolus 
as well as septic workup and empiric

antibiotics were started.  VBG with lactic acid was ordered.  ICU consult

was requested for further management and monitoring.  She was also found to

have severe neutropenia.



PAST MEDICAL HISTORY:  Ulcerative colitis, sclerosing cholangitis, and

cholangiocarcinoma.  The patient has COPD.



PAST SURGICAL HISTORY:  Status post cholecystectomy.



FAMILY HISTORY:  Noncontributory.



ALLERGIES:  ASPIRIN AND IBUPROFEN.



SOCIAL HISTORY:  No alcohol or illicit drug abuse.  No tobacco smoking.



REVIEW OF SYSTEM:  A review of 12-organ system other than mentioned in

history of present illness is negative.



MEDICATION AT HOME:  Advair, Flonase, vitamin D and calcium, albuterol

p.r.n.



PHYSICAL EXAMINATION:

VITAL SIGNS:  Temperature 99.7 (T-max however is 102.6), heart rate 114,

blood pressure 115/55, respiratory rate 16, oxygen saturation 99% on room

air.

HEENT:  Head and neck atraumatic.

LUNGS:  Clear auscultation bilaterally.

HEART:  Regular rate and rhythm.  S1 and S2 normal.

ABDOMEN:  Soft.  Mildly tender diffusely.

MUSCULOSKELETAL:  Trace bilateral pedal and ankle edema.

NEUROLOGICAL:  The patient moves all extremities spontaneously.

SKIN:  Moist.

PSYCHIATRIC:  The patient is alert, awake, in mild respiratory distress.



LABORATORY DATA:  Labs, sodium 134, potassium 3.8, chloride 104, carbon

dioxide 21, BUN 14, creatinine 0.5, glucose 85, magnesium 1.7, AST 43, ALT

50, total bilirubin 7.8, .  Troponin less than 0.01.  WBC 0.9,

platelet count 30.



CAT scan of the abdomen and pelvis revealed interval findings suspicious

for at least segmental colitis affecting the proximal and mid large bowel

with a left hemicolon, borderline affected, pancolitis not excluded

completely, mesenteric reaction is seen local to the colon with mild

abdominal and pelvic ascites present.  The lack of oral and intravenous

contrast limits evaluation.  No definite free air or abscess appreciated

grossly.  Gross intrahepatic biliary dilatation with lengthy wall stand

radiating at either the main hepatic duct or the main right lobe hepatic

duct, and extending to duodenum.  The lack of intravenous contrast limits

evaluation.  (This finding was discussed with Dr. Valencia, who suggested

that that may be related to chronic sclerosing cholangitis).



EKG, sinus tachycardia.



ASSESSMENT AND PLAN:  This is a 73-year-old lady with a history of

sclerosing cholangitis, ulcerative colitis, and cholangiocarcinoma status

post cholecystectomy, who presented with neutropenic fever, very low

absolute neutrophil count, and typhlitis one week post last cycle of

chemotherapy.  The patient likely has severe sepsis.  Fluid resuscitation is

ongoing.  Septic workup started.  VBG with lactic acid ordered. 

Procalcitonin is ordered.  Blood and urine culture were sent.  ID consult

requested.  Meanwhile, the patient received dose of meropenem and

vancomycin.  Flagyl was added.  Stool for Clostridium difficile was added

as well.  We will trend lactic acid level.  At present time, we will have

low threshold for intubation.  The patient will be going to ICU for further

management and monitoring.  We will continue with GI prophylaxis.  We will

maintain platelet count above 10 in the absence of overt bleeding.  We will

maintain hemoglobin above 8.  The patient received one dose of Granix to

boost WBC count.  We will continue target euvolemia, euglycemia,

normothermia and oxygen saturation more than 90%.



ccm time 40 min





__________________________________________

Samir Reid MD



DD:  06/06/2018 17:04:03

DT:  06/06/2018 17:08:14

Job # [id number]



ALETHA

## 2018-06-08 LAB
ALBUMIN SERPL-MCNC: 2 G/DL (ref 3–4.8)
ALBUMIN SERPL-MCNC: 2.2 G/DL (ref 3–4.8)
ALBUMIN/GLOB SERPL: 0.6 {RATIO} (ref 1.1–1.8)
ALBUMIN/GLOB SERPL: 0.7 {RATIO} (ref 1.1–1.8)
ALT SERPL-CCNC: 36 U/L (ref 7–56)
ALT SERPL-CCNC: 39 U/L (ref 7–56)
APTT BLD: 30.5 SECONDS (ref 25.1–36.5)
AST SERPL-CCNC: 36 U/L (ref 14–36)
AST SERPL-CCNC: 44 U/L (ref 14–36)
BASE EXCESS BLDV CALC-SCNC: -5.7 MMOL/L (ref 0–2)
BASE EXCESS BLDV CALC-SCNC: -5.9 MMOL/L (ref 0–2)
BASOPHILS # BLD AUTO: 0.03 K/MM3 (ref 0–2)
BASOPHILS NFR BLD: 0.7 % (ref 0–3)
BUN SERPL-MCNC: 11 MG/DL (ref 7–21)
BUN SERPL-MCNC: 11 MG/DL (ref 7–21)
CALCIUM SERPL-MCNC: 7.8 MG/DL (ref 8.4–10.5)
CALCIUM SERPL-MCNC: 7.8 MG/DL (ref 8.4–10.5)
EOSINOPHIL # BLD: 0 10*3/UL (ref 0–0.7)
EOSINOPHIL NFR BLD: 0.2 % (ref 1.5–5)
ERYTHROCYTE [DISTWIDTH] IN BLOOD BY AUTOMATED COUNT: 19.6 % (ref 11.5–14.5)
GFR NON-AFRICAN AMERICAN: > 60
GFR NON-AFRICAN AMERICAN: > 60
GRANULOCYTES # BLD: 1.68 10*3/UL (ref 1.4–6.5)
GRANULOCYTES NFR BLD: 39.8 % (ref 50–68)
HGB BLD-MCNC: 8.7 G/DL (ref 12–16)
INR PPP: 2.12 (ref 0.93–1.08)
LYMPHOCYTES # BLD: 0.9 10*3/UL (ref 1.2–3.4)
LYMPHOCYTES NFR BLD AUTO: 21.1 % (ref 22–35)
MCH RBC QN AUTO: 31.3 PG (ref 25–35)
MCHC RBC AUTO-ENTMCNC: 33.9 G/DL (ref 31–37)
MCV RBC AUTO: 92.4 FL (ref 80–105)
MONOCYTES # BLD AUTO: 1.6 10*3/UL (ref 0.1–0.6)
MONOCYTES NFR BLD: 38.2 % (ref 1–6)
PH BLDV: 7.36 [PH] (ref 7.32–7.43)
PH BLDV: 7.38 [PH] (ref 7.32–7.43)
PLATELET # BLD: 73 10^3/UL (ref 120–450)
PROTHROMBIN TIME: 24.8 SECONDS (ref 9.4–12.5)
RBC # BLD AUTO: 2.78 10^6/UL (ref 3.5–6.1)
VENOUS BLOOD FIO2: 21 %
VENOUS BLOOD FIO2: 21 %
VENOUS BLOOD GAS PCO2: 31 (ref 40–60)
VENOUS BLOOD GAS PCO2: 33 (ref 40–60)
VENOUS BLOOD GAS PO2: 107 MM/HG (ref 30–55)
VENOUS BLOOD GAS PO2: 57 MM/HG (ref 30–55)
WBC # BLD AUTO: 4.2 10^3/UL (ref 4.5–11)

## 2018-06-08 RX ADMIN — NYSTATIN SCH ML: 100000 SUSPENSION ORAL at 18:36

## 2018-06-08 RX ADMIN — NYSTATIN SCH ML: 100000 SUSPENSION ORAL at 10:32

## 2018-06-08 RX ADMIN — VANCOMYCIN HYDROCHLORIDE SCH MG: 500 INJECTION, POWDER, LYOPHILIZED, FOR SOLUTION INTRAVENOUS at 10:33

## 2018-06-08 RX ADMIN — VANCOMYCIN HYDROCHLORIDE SCH MG: 500 INJECTION, POWDER, LYOPHILIZED, FOR SOLUTION INTRAVENOUS at 23:02

## 2018-06-08 RX ADMIN — METRONIDAZOLE SCH MLS/HR: 500 INJECTION, SOLUTION INTRAVENOUS at 23:01

## 2018-06-08 RX ADMIN — METRONIDAZOLE SCH MLS/HR: 500 INJECTION, SOLUTION INTRAVENOUS at 14:12

## 2018-06-08 RX ADMIN — DEXTROSE AND SODIUM CHLORIDE SCH MLS/HR: 5; 900 INJECTION, SOLUTION INTRAVENOUS at 10:34

## 2018-06-08 RX ADMIN — MEROPENEM SCH MLS/HR: 1 INJECTION INTRAVENOUS at 23:00

## 2018-06-08 RX ADMIN — MEROPENEM SCH MLS/HR: 1 INJECTION INTRAVENOUS at 06:17

## 2018-06-08 RX ADMIN — MEROPENEM SCH MLS/HR: 1 INJECTION INTRAVENOUS at 14:00

## 2018-06-08 RX ADMIN — METRONIDAZOLE SCH MLS/HR: 500 INJECTION, SOLUTION INTRAVENOUS at 06:17

## 2018-06-08 RX ADMIN — NYSTATIN SCH ML: 100000 SUSPENSION ORAL at 23:00

## 2018-06-08 RX ADMIN — MEROPENEM SCH MLS/HR: 1 INJECTION INTRAVENOUS at 14:13

## 2018-06-08 RX ADMIN — VANCOMYCIN HYDROCHLORIDE SCH MG: 500 INJECTION, POWDER, LYOPHILIZED, FOR SOLUTION INTRAVENOUS at 14:09

## 2018-06-08 RX ADMIN — VANCOMYCIN HYDROCHLORIDE SCH MLS/HR: 1 INJECTION, POWDER, LYOPHILIZED, FOR SOLUTION INTRAVENOUS at 03:21

## 2018-06-08 RX ADMIN — BUDESONIDE SCH MG: 0.5 SUSPENSION RESPIRATORY (INHALATION) at 22:08

## 2018-06-08 RX ADMIN — VANCOMYCIN HYDROCHLORIDE SCH MG: 500 INJECTION, POWDER, LYOPHILIZED, FOR SOLUTION INTRAVENOUS at 18:35

## 2018-06-08 RX ADMIN — NYSTATIN SCH ML: 100000 SUSPENSION ORAL at 14:15

## 2018-06-08 NOTE — CP.PCM.PN
<Ade Caballero - Last Filed: 06/08/18 13:21>





Subjective





- Date & Time of Evaluation


Date of Evaluation: 06/08/18


Time of Evaluation: 10:00





- Subjective


Subjective: 





Seen and examined at the bedside earlier today, chart review.  Patient diarrhea 

reported to be improving.  Patient reported to have some odynophagia, being 

treated for oral thrush.  Patient denies nausea, vomiting, or abdominal pain.





Objective





- Vital Signs/Intake and Output


Vital Signs (last 24 hours): 


 











Temp Pulse Resp BP Pulse Ox


 


 98 F   95 H  17   109/49 L  99 


 


 06/08/18 01:00  06/08/18 12:30  06/08/18 12:30  06/08/18 12:30  06/08/18 12:30











- Medications


Medications: 


 Current Medications





Albuterol/Ipratropium (Duoneb 3 Mg/0.5 Mg (3 Ml) Ud)  3 ml IH [id number] PRN


   PRN Reason: Shortness of Breath


Arformoterol Tartrate (Brovana)  15 mcg IH [id number] EDISON


Budesonide (Pulmicort Respules)  0.5 mg IH [id number] EDISON


Al Hydrox/Mg Hydrox/Simethicone 30 ml/Diphenhydramine HCl 75 mg/Lidocaine 30 ml

  0 ml PO Q2H PRN


   PRN Reason: Mouth/Throat Pain


   Last Admin: 06/07/18 04:04 Dose:  30 ml


Metronidazole (Flagyl)  500 mg in 100 mls @ 100 mls/hr IVPB Q8 EDISON


   PRN Reason: Protocol


   Last Admin: 06/08/18 06:17 Dose:  100 mls/hr


Dextrose/Sodium Chloride (Dextrose 5%/0.9% Ns 1000 Ml)  1,000 mls @ 100 mls/hr 

IV .Q10H EDISON


   Last Admin: 06/08/18 10:34 Dose:  100 mls/hr


Meropenem (Merrem Iv 1 Gm Premix)  50 mls @ 100 mls/hr IVPB Q8 EDISON


   PRN Reason: Protocol


   Stop: 06/14/18 06:31


   Last Admin: 06/08/18 06:17 Dose:  100 mls/hr


Potassium Chloride (Potassium Chloride 20 Meq/100 Ml)  20 meq in 100 mls @ 50 

mls/hr IVPB Q2H EDISON


   Stop: 06/08/18 15:29


   Last Admin: 06/08/18 12:24 Dose:  50 mls/hr


Lidocaine HCl (Lidocaine 2% Viscous)  15 ml MM Q3H PRN


   PRN Reason: Other


   Last Admin: 06/08/18 09:06 Dose:  15 ml


Nystatin (Nystatin Oral Susp)  5 ml PO QID [facility]


   Last Admin: 06/08/18 10:32 Dose:  5 ml


Pantoprazole Sodium (Protonix Inj)  40 mg IVP DAILY [facility]


   Last Admin: 06/08/18 10:33 Dose:  40 mg


Vancomycin HCl (Vancocin 25 Mg/Ml (Oral Use))  125 mg PO QID EDISON


   PRN Reason: Protocol


   Last Admin: 06/08/18 10:33 Dose:  125 mg











- Labs


Labs: 


 





 06/08/18 05:40 





 06/08/18 08:00 





 











PT  24.8 SECONDS (9.4-12.5)  H  06/08/18  09:00    


 


INR  2.12  (0.93-1.08)  H  06/08/18  09:00    


 


APTT  30.5 Seconds (25.1-36.5)   06/08/18  09:00    














- Constitutional


Appears: No Acute Distress





- Head Exam


Head Exam: NORMOCEPHALIC





- Eye Exam


Eye Exam: Scleral icterus





- ENT Exam


ENT Exam: Mucous Membranes Moist





- Respiratory Exam


Respiratory Exam: NORMAL BREATHING PATTERN.  absent: Respiratory Distress





- Cardiovascular Exam


Cardiovascular Exam: +S1, +S2





- GI/Abdominal Exam


GI & Abdominal Exam: Soft, Normal Bowel Sounds.  absent: Guarding, Tenderness, 

Rebound





- Extremities Exam


Extremities Exam: Pedal Edema.  absent: Calf Tenderness





- Neurological Exam


Neurological Exam: Alert, Awake, Oriented x3





- Skin


Skin Exam: Dry, Warm





Assessment and Plan





- Assessment and Plan (Free Text)


Assessment: 





Assessment: 





Cdiff Positive


Odynphagia, orat thrush


Sepsis with neutropenic fever secondary to pancolitis 


Pancytopenia 


Cholangiocarcinoma on chemotherapy 


Primary sclerosing cholangitis 


Cirrhosis s/p ERCP with biliary stent 


Ulcerative colitis


COPD


Giant cell arteritis  





Plan:


 


Continue IV antibiotics


on oral Vancomycin


on Nystatin


Neutropenic precautions


ID on consult


continue to monitor LFTs and CBC


Oncology FU


Palliative care following


diet as tolerated


DNR/DNI 





Seen and discussed with Dr. Valencia. 





<Nick Valencia - Last Filed: 06/09/18 00:21>





Objective





- Vital Signs/Intake and Output


Vital Signs (last 24 hours): 


 











Temp Pulse Resp BP Pulse Ox


 


 98.5 F   99 H  16   105/48 L  98 


 


 06/08/18 20:00  06/08/18 23:50  06/08/18 23:50  06/08/18 23:00  06/08/18 23:50








Intake and Output: 


 











 06/08/18 06/09/18





 18:59 06:59


 


Intake Total  1750


 


Output Total  102


 


Balance  1648














- Medications


Medications: 


 Current Medications





Albuterol/Ipratropium (Duoneb 3 Mg/0.5 Mg (3 Ml) Ud)  3 ml IH [id number] PRN


   PRN Reason: Shortness of Breath


Arformoterol Tartrate (Brovana)  15 mcg IH [id number] EDISON


Budesonide (Pulmicort Respules)  0.5 mg IH [id number] [facility]


   Last Admin: 06/08/18 22:08 Dose:  0.5 mg


Al Hydrox/Mg Hydrox/Simethicone 30 ml/Diphenhydramine HCl 75 mg/Lidocaine 30 ml

  0 ml PO Q2H PRN


   PRN Reason: Mouth/Throat Pain


   Last Admin: 06/07/18 04:04 Dose:  30 ml


Metronidazole (Flagyl)  500 mg in 100 mls @ 100 mls/hr IVPB Q8 EDISON


   PRN Reason: Protocol


   Last Admin: 06/08/18 23:01 Dose:  100 mls/hr


Dextrose/Sodium Chloride (Dextrose 5%/0.9% Ns 1000 Ml)  1,000 mls @ 100 mls/hr 

IV .Q10H [facility]


   Last Admin: 06/08/18 10:34 Dose:  100 mls/hr


Meropenem (Merrem Iv 1 Gm Premix)  50 mls @ 100 mls/hr IVPB Q8 EDISON


   PRN Reason: Protocol


   Stop: 06/14/18 06:31


   Last Admin: 06/08/18 23:00 Dose:  100 mls/hr


Lidocaine HCl (Lidocaine 2% Viscous)  15 ml MM Q3H PRN


   PRN Reason: Other


   Last Admin: 06/08/18 09:06 Dose:  15 ml


Nystatin (Nystatin Oral Susp)  5 ml PO QID [facility]


   Last Admin: 06/08/18 23:00 Dose:  5 ml


Pantoprazole Sodium (Protonix Inj)  40 mg IVP DAILY [facility]


   Last Admin: 06/08/18 10:33 Dose:  40 mg


Vancomycin HCl (Vancocin 25 Mg/Ml (Oral Use))  125 mg PO QID EDISON


   PRN Reason: Protocol


   Last Admin: 06/08/18 23:02 Dose:  125 mg











- Labs


Labs: 


 





 06/08/18 05:40 





 06/08/18 17:00 





 











PT  24.8 SECONDS (9.4-12.5)  H  06/08/18  09:00    


 


INR  2.12  (0.93-1.08)  H  06/08/18  09:00    


 


APTT  30.5 Seconds (25.1-36.5)   06/08/18  09:00    














Attending/Attestation





- Attestation


I have personally seen and examined this patient.: Yes


I have fully participated in the care of the patient.: Yes


I have reviewed all pertinent clinical information, including history, physical 

exam and plan: Yes


Notes (Text): 


This is an addendum to GI progress report dictated by DIANA Oliveira.The 

patient was seen and examined earlier.  Medical records, lab studies, imagings 

were reviewed.  Last 24 hours events reviewed.  Agreed with the above treatment 

plan as outlined in  DIANA Oliveira's notes  with the addition of the 

following 





06/09/18 00:20

## 2018-06-08 NOTE — CON
DATE:  06/06/2018



This is a re-dictation, could not locate the previous dictation.



HISTORY OF PRESENT ILLNESS:  This patient was seen in the Emergency Room

earlier.  Discussed with the ER attending and also the resident.  This

73-year-old patient well known with metastatic cholangiocarcinoma, on

chemotherapy, being followed by Dr. Romano, was brought to the Emergency

Room for complaints of diarrhea, several episodes.  The patient also

complains of weakness.  The patient had chemotherapy a week ago.  The

patient has a history of ulcerative colitis, history of anaphylactic

reaction to aspirin.  The patient was not on mesalamine.  The last

colonoscopy done in 2017 showed mild colitis.  The patient does have

sclerosing cholangitis, has had a cholangiocarcinoma.  The patient was

found to have metastatic disease, being followed at the [facility],

presently on chemo.  In the ER, the patient was found to be hypotensive,

pancytopenic, and the CT scan was done in the ER, found to have thickening

of the colon with pericolonic stranding both, right greater than the left

side.  The patient also had some ascites, intrahepatic duct dilation.  The

patient was found to have a metal biliary stent present.  He was also

noticed to have elevated liver enzymes.



PAST MEDICAL HISTORY:  Other past medical history significant as above,

history of COPD.



PAST SURGICAL HISTORY:  Status post cholecystectomy, history of sinus

surgeries done in the [facility].



FAMILY HISTORY:  Noncontributory.



ALLERGIES:  ALLERGIC TO ASPIRIN AND IBUPROFEN, ANAPHYLACTIC REACTION.



SOCIAL HISTORY:  Denies alcohol.



REVIEW OF SYSTEMS:  Positive as above.



PHYSICAL EXAMINATION:

GENERAL:  The patient is lying on the bed, not in acute distress.

VITAL SIGNS:  Blood pressure 98/52, heart rate 112, respirations 18, O2

saturation is 99%.

HEENT:  Jaundiced.

NECK:  Supple.

HEART:  S1, S2 heard.

LUNGS:  Bilateral air entry present, reduced at he base.

ABDOMEN:  Softly distended.  Bowel sounds present, diffuse tenderness

present.

EXTREMITIES:  No cyanosis, no clubbing.  Pedal edema present bilaterally.

NEUROLOGICAL:  Alert, oriented.



LABORATORY DATA:  WBC 0.9, hemoglobin 8.1, hematocrit 23.8, platelets 31. 

INR 2.  Chemistry:  Total bilirubin 7.5, AST 39, ALT 40, total alkaline

phosphatase 137.  BUN 14, creatinine 0.5.  CT scan of the abdomen, findings

reviewed as above.



IMPRESSION:  This is a 73-year-old patient with metastatic

cholangiocarcinoma, on chemotherapy, admitted with diarrhea, hypotension,

pancytopenia.  The patient's CT shows diffuse colitis, right more than the

left side and the patient does have elevated liver function tests, history

of biliary stricture status post metallic stent placement.

1.  Colitis, most likely secondary to the chemo induced, rule out an

ischemic component because of the severe sepsis.

2.  Elevated liver function tests, the patient's alkaline phosphatase is

not significantly elevated compared to total bilirubin,  We need to rule

out chemo-induced and biliary obstruction also to be considered and sepsis

could be the cause.  We would recommend hematological followup.

3.  Pancytopenia, severe neutropenia.



Would recommend:

1.  Cultures.

2.  Continue the antibiotics as per ID.

3.  Hematological/oncological followup.

4.  Follow up of the LFT's relatively and stool for C. diff.



The patient is n.p.o.  We will continue to close follow up his care and

suggest further management based upon the clinical course.







__________________________________________

Nick Valencia MD



DD:  06/07/2018 20:06:18

DT:  06/07/2018 22:59:06

Job # [id number]

## 2018-06-08 NOTE — CP.PCM.PN
Subjective





- Date & Time of Evaluation


Date of Evaluation: 06/08/18


Time of Evaluation: 09:45





- Subjective


Subjective: 





Still with abdominal cramping but a little better, loose stools are a little 

better, no fevers this morning.





Objective





- Vital Signs/Intake and Output


Vital Signs (last 24 hours): 


 











Temp Pulse Resp BP Pulse Ox


 


 98 F   101 H  23   101/49 L  99 


 


 06/08/18 01:00  06/08/18 07:50  06/08/18 07:50  06/08/18 07:30  06/08/18 07:50











- Medications


Medications: 


 Current Medications





Albuterol/Ipratropium (Duoneb 3 Mg/0.5 Mg (3 Ml) Ud)  3 ml IH [id number] PRN


   PRN Reason: Shortness of Breath


Arformoterol Tartrate (Brovana)  15 mcg IH [id number] [facility]


Budesonide (Pulmicort Respules)  0.5 mg IH [id number] [facility]


Al Hydrox/Mg Hydrox/Simethicone 30 ml/Diphenhydramine HCl 75 mg/Lidocaine 30 ml

  0 ml PO Q2H PRN


   PRN Reason: Mouth/Throat Pain


   Last Admin: 06/07/18 04:04 Dose:  30 ml


Metronidazole (Flagyl)  500 mg in 100 mls @ 100 mls/hr IVPB Q8 [facility]


   PRN Reason: Protocol


   Last Admin: 06/08/18 06:17 Dose:  100 mls/hr


Dextrose/Sodium Chloride (Dextrose 5%/0.9% Ns 1000 Ml)  1,000 mls @ 100 mls/hr 

IV .Q10H [facility]


   Last Admin: 06/07/18 22:31 Dose:  100 mls/hr


Meropenem (Merrem Iv 1 Gm Premix)  50 mls @ 100 mls/hr IVPB Q8 [facility]


   PRN Reason: Protocol


   Stop: 06/14/18 06:31


   Last Admin: 06/08/18 06:17 Dose:  100 mls/hr


Lidocaine HCl (Lidocaine 2% Viscous)  15 ml MM Q3H PRN


   PRN Reason: Other


   Last Admin: 06/08/18 09:06 Dose:  15 ml


Nystatin (Nystatin Oral Susp)  5 ml PO QID [facility]


   Last Admin: 06/07/18 22:28 Dose:  5 ml


Pantoprazole Sodium (Protonix Inj)  40 mg IVP DAILY [facility]


   Last Admin: 06/07/18 12:54 Dose:  40 mg


Vancomycin HCl (Vancocin 25 Mg/Ml (Oral Use))  125 mg PO QID [facility]


   PRN Reason: Protocol


   Last Admin: 06/07/18 22:30 Dose:  125 mg











- Labs


Labs: 


 





 06/08/18 05:40 





 06/08/18 08:00 





 











PT  24.8 SECONDS (9.4-12.5)  H  06/08/18  09:00    


 


INR  2.12  (0.93-1.08)  H  06/08/18  09:00    


 


APTT  30.5 Seconds (25.1-36.5)   06/08/18  09:00    














- Constitutional


Appears: Chronically Ill





- Head Exam


Head Exam: NORMAL INSPECTION





- ENT Exam


ENT Exam: Mucous Membranes Moist





- Neck Exam


Neck Exam: absent: Lymphadenopathy, Meningismus





- Respiratory Exam


Respiratory Exam: Decreased Breath Sounds





- Cardiovascular Exam


Cardiovascular Exam: +S1, +S2





- GI/Abdominal Exam


GI & Abdominal Exam: Soft.  absent: Tenderness





Assessment and Plan





- Assessment and Plan (Free Text)


Plan: 





Assessment


Severe Sepsis with febrile neutropenia, due to pancolitis


history of sepsis from biliary tree as the source in a patient with primary 

sclerosing cholangitis with liver cirrhosis S/P ERCP and biliary stent 

placement - S/P biopsy of the enlarged lymph nodes adjacent to the common bile 

duct


cholangiocarcinoma on chemotherapy


S/P cholecystectomy


Ulcerative colitis


history of MSSA sinusitis


COPD


allergic rhinitis


giant cell arteritis





Plan


will d/c IV Vancomycin since blood cx are negative, will continue Merrem day 2 

pending stool cx, stool for C. diff and continue PO Vancomycin and IV Flagyl 

day 2


will continue monitor clinically


overall prognosis is poor


discussed with Dr. Valencia previously

## 2018-06-08 NOTE — CP.PCM.PN
Subjective





- Date & Time of Evaluation


Date of Evaluation: 06/08/18


Time of Evaluation: 10:00





- Subjective


Subjective: 





Alert, oriented, denies pain. no acute overnights 





Objective





- Vital Signs/Intake and Output


Vital Signs (last 24 hours): 


 











Temp Pulse Resp BP Pulse Ox


 


 98 F   99 H  22   114/55 L  98 


 


 06/08/18 01:00  06/08/18 14:29  06/08/18 14:29  06/08/18 14:31  06/08/18 14:29











- Medications


Medications: 


 Current Medications





Albuterol/Ipratropium (Duoneb 3 Mg/0.5 Mg (3 Ml) Ud)  3 ml IH [id number] PRN


   PRN Reason: Shortness of Breath


Arformoterol Tartrate (Brovana)  15 mcg IH [id number] [facility]


Budesonide (Pulmicort Respules)  0.5 mg IH [id number] [facility]


Al Hydrox/Mg Hydrox/Simethicone 30 ml/Diphenhydramine HCl 75 mg/Lidocaine 30 ml

  0 ml PO Q2H PRN


   PRN Reason: Mouth/Throat Pain


   Last Admin: 06/07/18 04:04 Dose:  30 ml


Metronidazole (Flagyl)  500 mg in 100 mls @ 100 mls/hr IVPB Q8 [facility]


   PRN Reason: Protocol


   Last Admin: 06/08/18 14:12 Dose:  100 mls/hr


Dextrose/Sodium Chloride (Dextrose 5%/0.9% Ns 1000 Ml)  1,000 mls @ 100 mls/hr 

IV .Q10H [facility]


   Last Admin: 06/08/18 10:34 Dose:  100 mls/hr


Meropenem (Merrem Iv 1 Gm Premix)  50 mls @ 100 mls/hr IVPB Q8 [facility]


   PRN Reason: Protocol


   Stop: 06/14/18 06:31


   Last Admin: 06/08/18 14:13 Dose:  100 mls/hr


Potassium Chloride (Potassium Chloride 20 Meq/100 Ml)  20 meq in 100 mls @ 50 

mls/hr IVPB Q2H [facility]


   Stop: 06/08/18 15:29


   Last Admin: 06/08/18 12:24 Dose:  50 mls/hr


Lidocaine HCl (Lidocaine 2% Viscous)  15 ml MM Q3H PRN


   PRN Reason: Other


   Last Admin: 06/08/18 09:06 Dose:  15 ml


Nystatin (Nystatin Oral Susp)  5 ml PO QID [facility]


   Last Admin: 06/08/18 14:15 Dose:  5 ml


Pantoprazole Sodium (Protonix Inj)  40 mg IVP DAILY [facility]


   Last Admin: 06/08/18 10:33 Dose:  40 mg


Vancomycin HCl (Vancocin 25 Mg/Ml (Oral Use))  125 mg PO QID [facility]


   PRN Reason: Protocol


   Last Admin: 06/08/18 14:09 Dose:  125 mg











- Labs


Labs: 


 





 06/08/18 05:40 





 06/08/18 08:00 





 











PT  24.8 SECONDS (9.4-12.5)  H  06/08/18  09:00    


 


INR  2.12  (0.93-1.08)  H  06/08/18  09:00    


 


APTT  30.5 Seconds (25.1-36.5)   06/08/18  09:00    














- Constitutional


Appears: Chronically Ill





- Head Exam


Head Exam: NORMOCEPHALIC





- Eye Exam


Eye Exam: PERRL, Scleral icterus





- ENT Exam


ENT Exam: Mucous Membranes Moist


Additional comments: 





sores on insides of lips 





- Neck Exam


Neck Exam: Normal Inspection





- Respiratory Exam


Respiratory Exam: Decreased Breath Sounds, NORMAL BREATHING PATTERN





- Cardiovascular Exam


Cardiovascular Exam: REGULAR RHYTHM, +S1, +S2





- GI/Abdominal Exam


GI & Abdominal Exam: Distended, Soft, Normal Bowel Sounds





- Extremities Exam


Extremities Exam: Normal Inspection





- Back Exam


Additional comments: 





lumbar tenderness





- Skin


Skin Exam: Dry


Additional comments: 





jaundice





Assessment and Plan





- Assessment and Plan (Free Text)


Assessment: 





73 year old female with history of metastatic cholangiocarcinoma who is 

admitted with pancytopenia, colitis, sepsis





The patient is alert and oriented. She understands that she has advanced 

cancer. She states that she doesn't want to continue chemotherapy treatment. 

Advance care planning discussion ensued. Benefits and burdens of CPR/intubation 

discussed. Questions answered. The patient does not want to be intubated or 

have CPR to prolong life. POLST: DNR/DNI directive completed> Her daughter 

Aroldo Franco is named as health care surrogate. A copy is placed in the 

chart.





Time spent in goals of care and advance care planning, 30 minutes


Plan: 





Advance care planning





 POLST: DNR/DNI

## 2018-06-08 NOTE — CP.[email or web address]
<Katlyn Munoz - Last Filed: 06/08/18 14:27>





CCU Subjective





- Physician Review


Subjective (Free Text): 





06/08/18 14:21


Per pt, Scanty diarrhea. Abdominal pain resolved. throat hurt when swallows. No 

other complain





CCU Objective





- Vital Signs / Intake & Output


Vital Signs (Last 4 hours): 


Vital Signs











  Pulse Resp BP Pulse Ox


 


 06/08/18 07:50  101 H  23   99


 


 06/08/18 07:40  96 H  15   99


 


 06/08/18 07:30  97 H  17  101/49 L  98


 


 06/08/18 07:20  97 H  18   98


 


 06/08/18 07:10  98 H  15   98


 


 06/08/18 07:00  99 H  16  102/43 L  98


 


 06/08/18 06:50  99 H  16   99


 


 06/08/18 06:40  100 H  22   99


 


 06/08/18 06:30  101 H  23  96/43 L  97


 


 06/08/18 06:20  102 H  19   97


 


 06/08/18 06:10  103 H  18   97


 


 06/08/18 06:00  103 H  19  102/45 L  97


 


 06/08/18 05:50  101 H  16   96


 


 06/08/18 05:40  102 H  19   97


 


 06/08/18 05:30  102 H  20  97/37 L  97


 


 06/08/18 05:20  101 H  21   96














- Physical Exam


Head: Positive for: Atraumatic.  Negative for: Tenderness, Contusion, Swelling


Pupils: Positive for: PERRL.  Negative for: Pinpoint


Extroacular Muscles: Positive for: EOMI.  Negative for: Other


Conjunctiva: Positive for: Icteric.  Negative for: Injected


Mouth: Positive for: Dry, Other (ulcer with white top at R inner buccal 

membrane ).  Negative for: Moist Mucous Membranes, Normal Lips (cracked, dry)


Nose (External): Positive for: Atraumatic.  Negative for: Abrasion, Laceration


Nose (Internal): Positive for: No Active Bleeding.  Negative for: Moist, 

Epistaxis


Neck: Positive for: Normal Range of Motion.  Negative for: JVD


Respiratory/Chest: Positive for: Decreased Breath Sounds.  Negative for: 

Respiratory Distress, Accessory Muscle Use, Wheezes, Rales, Rhonchi


Cardiovascular: Positive for: Normal S1, S2, Peripheal Pulses Present (+1 

bilateral radial pulses, unable to palpate LE pulses), Tachycardic.  Negative 

for: Regular Rate and Rhythm, Murmurs, Irregular Rhythm, Bradycardic


Abdomen: Positive for: Distention (not firm or rigid, but distended).  Negative 

for: Tenderness, Normal Bowel Sounds (hyperactive bowel sounds), Feeding Tubes


Back: Positive for: Normal Inspection


Upper Extremity: Positive for: Normal Inspection, Normal ROM (minimal active 

ROM spontaneously, but passive ROM intact and able to move on command), NORMAL 

PULSES.  Negative for: Cyanosis, Edema, Tenderness, Swelling, Erythema


Lower Extremity: Positive for: Edema (+1 pitting edema in fee/ankles covered by 

socks, +2 for remaineder of bilateral LE below the knees), Normal ROM (minimal 

active ROM spontaneously, but passive ROM intact and able to move on command), 

Swelling, Temperature Abnormalties (cool to palpation).  Negative for: Normal 

Inspection, CALF TENDERNESS, NORMAL PULSES (unable to palpate), Tenderness, 

Erythema


Neurological: Positive for: GCS=15, Other (minimal spontaneous movements due to 

lethargy, but active ROM on command appropriate).  Negative for: Speech Normal (

slow and faint speech, but purposeful and appropriate speech)


Skin: Positive for: Warm, Dry, Other (Grossly icteric).  Negative for: Normal 

Color


Lymphatic: Positive for: Cervical Adenopathy


Psychiatric: Positive for: Alert, Oriented x 3.  Negative for: Normal 

Concentration





- Medications


Active Medications: 


Active Medications











Generic Name Dose Route Start Last Admin





  Trade Name Freq  PRN Reason Stop Dose Admin


 


Albuterol/Ipratropium  3 ml  06/08/18 08:51  





  Duoneb 3 Mg/0.5 Mg (3 Ml) Ud  IH   





  [id number] PRN   





  Shortness of Breath   


 


Arformoterol Tartrate  15 mcg  06/08/18 20:00  





  [place]  IH   





  [id number] [facility]   


 


Budesonide  0.5 mg  06/08/18 20:00  





  Pulmicort Respules  IH   





  [id number] EDISON   


 


Al Hydrox/Mg Hydrox/  0 ml  06/06/18 17:26  06/07/18 04:04





Simethicone 30 ml/  PO   30 ml





Diphenhydramine HCl 75 mg/  Q2H PRN   Administration





Lidocaine 30 ml  Mouth/Throat Pain   


 


Vancomycin HCl  1 gm in 250 mls @ 167 mls/hr  06/06/18 15:45  06/08/18 03:21





  Vancomycin 1gm  IVPB   167 mls/hr





  Q12H EDISON   Administration





  Protocol   


 


Metronidazole  500 mg in 100 mls @ 100 mls/hr  06/06/18 16:00  06/08/18 06:17





  Flagyl  IVPB   100 mls/hr





  Q8 EDISON   Administration





  Protocol   


 


Dextrose/Sodium Chloride  1,000 mls @ 100 mls/hr  06/06/18 16:00  06/07/18 22:31





  Dextrose 5%/0.9% Ns 1000 Ml  IV   100 mls/hr





  .Q10H EDISON   Administration


 


Meropenem  50 mls @ 100 mls/hr  06/07/18 06:30  06/08/18 06:17





  Merrem Iv 1 Gm Premix  IVPB  06/14/18 06:31  100 mls/hr





  Q8 EDISON   Administration





  Protocol   


 


Lidocaine HCl  15 ml  06/07/18 16:30  06/08/18 09:06





  Lidocaine 2% Viscous  MM   15 ml





  Q3H PRN   Administration





  Other   


 


Nystatin  5 ml  06/06/18 18:00  06/07/18 22:28





  Nystatin Oral Susp  PO   5 ml





  QID EDISON   Administration


 


Pantoprazole Sodium  40 mg  06/07/18 10:00  06/07/18 12:54





  Protonix Inj  IVP   40 mg





  DAILY EDISON   Administration


 


Vancomycin HCl  125 mg  06/07/18 14:00  06/07/18 22:30





  Vancocin 25 Mg/Ml (Oral Use)  PO   125 mg





  QID EDISON   Administration





  Protocol   














- Patient Studies


Lab Studies: 


 Lab Studies











  06/08/18 06/08/18 06/08/18 Range/Units





  09:00 08:00 07:32 


 


WBC     (4.5-11.0)  10^3/ul


 


RBC     (3.5-6.1)  10^6/uL


 


Hgb     (12.0-16.0)  g/dL


 


Hct     (36.0-48.0)  %


 


MCV     (80.0-105.0)  fl


 


MCH     (25.0-35.0)  pg


 


MCHC     (31.0-37.0)  g/dl


 


RDW     (11.5-14.5)  %


 


Plt Count     (120.0-450.0)  10^3/uL


 


Gran %     (50.0-68.0)  %


 


Lymph % (Auto)     (22.0-35.0)  %


 


Mono % (Auto)     (1.0-6.0)  %


 


Eos % (Auto)     (1.5-5.0)  %


 


Baso % (Auto)     (0.0-3.0)  %


 


Gran #     (1.4-6.5)  


 


Lymph # (Auto)     (1.2-3.4)  


 


Mono # (Auto)     (0.1-0.6)  


 


Eos # (Auto)     (0.0-0.7)  


 


Baso # (Auto)     (0.0-2.0)  K/mm3


 


pO2  57 H    (30-55)  mm/Hg


 


VBG pH  7.36    (7.32-7.43)  


 


VBG pCO2  33.0 L    (40-60)  


 


VBG HCO3  18.6 L    (21-28)  mmol/l


 


VBG Total CO2  19.6 L    (22-28)  mmol.L


 


VBG O2 Sat (Calc)  93.9 H    (40-65)  %


 


VBG Base Excess  -5.9 L    (0.0-2.0)  mmol/L


 


VBG Potassium  2.8 L    (3.6-5.2)  mmol/L


 


Sodium  138.0  141   (132-148)  mmol/L


 


Chloride  113.0 H  112 H   ()  mmol/L


 


Glucose  105    ()  mg/dl


 


Lactate  2.7 H    (0.7-2.1)  mmol/L


 


FiO2  21.0    %


 


Potassium   3.0 L   (3.6-5.0)  mmol/L


 


Carbon Dioxide   17 L   (21-33)  mmol/L


 


Anion Gap   14   (10-20)  


 


BUN   11   (7-21)  mg/dL


 


Creatinine   0.5 L   (0.7-1.2)  mg/dl


 


Est GFR (African Amer)   > 60   


 


Est GFR (Non-Af Amer)   > 60   


 


POC Glucose (mg/dL)    82  ()  mg/dL


 


Random Glucose   85   ()  mg/dL


 


Calcium   7.8 L   (8.4-10.5)  mg/dL


 


Phosphorus     (2.5-4.5)  mg/dL


 


Magnesium   1.5 L   (1.7-2.2)  mg/dL


 


Total Bilirubin   5.7 H   (0.2-1.3)  mg/dL


 


AST   36   (14-36)  U/L


 


ALT   36   (7-56)  U/L


 


Alkaline Phosphatase   150 H D   ()  U/L


 


Lactate Dehydrogenase     (333-699)  U/L


 


Total Protein   5.5 L   (5.8-8.3)  g/dL


 


Albumin   2.2 L   (3.0-4.8)  g/dL


 


Globulin   3.3   gm/dL


 


Albumin/Globulin Ratio   0.7 L   (1.1-1.8)  


 


Procalcitonin     (0.19-0.49)  NG/ML


 


Venous Blood Potassium  2.8 L    (3.6-5.2)  mmol/L














  06/08/18 06/08/18 06/08/18 Range/Units





  05:40 05:40 05:30 


 


WBC   4.2 L D   (4.5-11.0)  10^3/ul


 


RBC   2.78 L   (3.5-6.1)  10^6/uL


 


Hgb   8.7 L   (12.0-16.0)  g/dL


 


Hct   25.7 L   (36.0-48.0)  %


 


MCV   92.4   (80.0-105.0)  fl


 


MCH   31.3   (25.0-35.0)  pg


 


MCHC   33.9   (31.0-37.0)  g/dl


 


RDW   19.6 H   (11.5-14.5)  %


 


Plt Count   73 L   (120.0-450.0)  10^3/uL


 


Gran %   39.8 L   (50.0-68.0)  %


 


Lymph % (Auto)   21.1 L   (22.0-35.0)  %


 


Mono % (Auto)   38.2 H   (1.0-6.0)  %


 


Eos % (Auto)   0.2 L   (1.5-5.0)  %


 


Baso % (Auto)   0.7   (0.0-3.0)  %


 


Gran #   1.68   (1.4-6.5)  


 


Lymph # (Auto)   0.9 L   (1.2-3.4)  


 


Mono # (Auto)   1.6 H   (0.1-0.6)  


 


Eos # (Auto)   0.0   (0.0-0.7)  


 


Baso # (Auto)   0.03   (0.0-2.0)  K/mm3


 


pO2     (30-55)  mm/Hg


 


VBG pH     (7.32-7.43)  


 


VBG pCO2     (40-60)  


 


VBG HCO3     (21-28)  mmol/l


 


VBG Total CO2     (22-28)  mmol.L


 


VBG O2 Sat (Calc)     (40-65)  %


 


VBG Base Excess     (0.0-2.0)  mmol/L


 


VBG Potassium     (3.6-5.2)  mmol/L


 


Sodium     (132-148)  mmol/L


 


Chloride     ()  mmol/L


 


Glucose     ()  mg/dl


 


Lactate     (0.7-2.1)  mmol/L


 


FiO2     %


 


Potassium     (3.6-5.0)  mmol/L


 


Carbon Dioxide     (21-33)  mmol/L


 


Anion Gap     (10-20)  


 


BUN     (7-21)  mg/dL


 


Creatinine     (0.7-1.2)  mg/dl


 


Est GFR (African Amer)     


 


Est GFR (Non-Af Amer)     


 


POC Glucose (mg/dL)     ()  mg/dL


 


Random Glucose     ()  mg/dL


 


Calcium     (8.4-10.5)  mg/dL


 


Phosphorus  1.7 L    (2.5-4.5)  mg/dL


 


Magnesium     (1.7-2.2)  mg/dL


 


Total Bilirubin     (0.2-1.3)  mg/dL


 


AST     (14-36)  U/L


 


ALT     (7-56)  U/L


 


Alkaline Phosphatase     ()  U/L


 


Lactate Dehydrogenase    390  (333-699)  U/L


 


Total Protein     (5.8-8.3)  g/dL


 


Albumin     (3.0-4.8)  g/dL


 


Globulin     gm/dL


 


Albumin/Globulin Ratio     (1.1-1.8)  


 


Procalcitonin     (0.19-0.49)  NG/ML


 


Venous Blood Potassium     (3.6-5.2)  mmol/L














  06/08/18 06/07/18 06/07/18 Range/Units





  03:59 23:56 19:56 


 


WBC     (4.5-11.0)  10^3/ul


 


RBC     (3.5-6.1)  10^6/uL


 


Hgb     (12.0-16.0)  g/dL


 


Hct     (36.0-48.0)  %


 


MCV     (80.0-105.0)  fl


 


MCH     (25.0-35.0)  pg


 


MCHC     (31.0-37.0)  g/dl


 


RDW     (11.5-14.5)  %


 


Plt Count     (120.0-450.0)  10^3/uL


 


Gran %     (50.0-68.0)  %


 


Lymph % (Auto)     (22.0-35.0)  %


 


Mono % (Auto)     (1.0-6.0)  %


 


Eos % (Auto)     (1.5-5.0)  %


 


Baso % (Auto)     (0.0-3.0)  %


 


Gran #     (1.4-6.5)  


 


Lymph # (Auto)     (1.2-3.4)  


 


Mono # (Auto)     (0.1-0.6)  


 


Eos # (Auto)     (0.0-0.7)  


 


Baso # (Auto)     (0.0-2.0)  K/mm3


 


pO2     (30-55)  mm/Hg


 


VBG pH     (7.32-7.43)  


 


VBG pCO2     (40-60)  


 


VBG HCO3     (21-28)  mmol/l


 


VBG Total CO2     (22-28)  mmol.L


 


VBG O2 Sat (Calc)     (40-65)  %


 


VBG Base Excess     (0.0-2.0)  mmol/L


 


VBG Potassium     (3.6-5.2)  mmol/L


 


Sodium     (132-148)  mmol/L


 


Chloride     ()  mmol/L


 


Glucose     ()  mg/dl


 


Lactate     (0.7-2.1)  mmol/L


 


FiO2     %


 


Potassium     (3.6-5.0)  mmol/L


 


Carbon Dioxide     (21-33)  mmol/L


 


Anion Gap     (10-20)  


 


BUN     (7-21)  mg/dL


 


Creatinine     (0.7-1.2)  mg/dl


 


Est GFR (African Amer)     


 


Est GFR (Non-Af Amer)     


 


POC Glucose (mg/dL)  89  110  98  ()  mg/dL


 


Random Glucose     ()  mg/dL


 


Calcium     (8.4-10.5)  mg/dL


 


Phosphorus     (2.5-4.5)  mg/dL


 


Magnesium     (1.7-2.2)  mg/dL


 


Total Bilirubin     (0.2-1.3)  mg/dL


 


AST     (14-36)  U/L


 


ALT     (7-56)  U/L


 


Alkaline Phosphatase     ()  U/L


 


Lactate Dehydrogenase     (333-699)  U/L


 


Total Protein     (5.8-8.3)  g/dL


 


Albumin     (3.0-4.8)  g/dL


 


Globulin     gm/dL


 


Albumin/Globulin Ratio     (1.1-1.8)  


 


Procalcitonin     (0.19-0.49)  NG/ML


 


Venous Blood Potassium     (3.6-5.2)  mmol/L














  06/07/18 06/07/18 06/07/18 Range/Units





  16:19 16:01 11:59 


 


WBC     (4.5-11.0)  10^3/ul


 


RBC     (3.5-6.1)  10^6/uL


 


Hgb     (12.0-16.0)  g/dL


 


Hct     (36.0-48.0)  %


 


MCV     (80.0-105.0)  fl


 


MCH     (25.0-35.0)  pg


 


MCHC     (31.0-37.0)  g/dl


 


RDW     (11.5-14.5)  %


 


Plt Count     (120.0-450.0)  10^3/uL


 


Gran %     (50.0-68.0)  %


 


Lymph % (Auto)     (22.0-35.0)  %


 


Mono % (Auto)     (1.0-6.0)  %


 


Eos % (Auto)     (1.5-5.0)  %


 


Baso % (Auto)     (0.0-3.0)  %


 


Gran #     (1.4-6.5)  


 


Lymph # (Auto)     (1.2-3.4)  


 


Mono # (Auto)     (0.1-0.6)  


 


Eos # (Auto)     (0.0-0.7)  


 


Baso # (Auto)     (0.0-2.0)  K/mm3


 


pO2    68 H  (30-55)  mm/Hg


 


VBG pH    7.41  (7.32-7.43)  


 


VBG pCO2    32.0 L  (40-60)  


 


VBG HCO3    20.3 L  (21-28)  mmol/l


 


VBG Total CO2    21.3 L  (22-28)  mmol.L


 


VBG O2 Sat (Calc)    98.1 H  (40-65)  %


 


VBG Base Excess    -3.4 L  (0.0-2.0)  mmol/L


 


VBG Potassium    3.1 L  (3.6-5.2)  mmol/L


 


Sodium  138   137.0  (132-148)  mmol/L


 


Chloride  110 H   110.0 H  ()  mmol/L


 


Glucose    78  ()  mg/dl


 


Lactate    2.2 H  (0.7-2.1)  mmol/L


 


FiO2    21.0  %


 


Potassium  3.0 L    (3.6-5.0)  mmol/L


 


Carbon Dioxide  17 L    (21-33)  mmol/L


 


Anion Gap  14    (10-20)  


 


BUN  13    (7-21)  mg/dL


 


Creatinine  0.5 L    (0.7-1.2)  mg/dl


 


Est GFR (African Amer)  > 60    


 


Est GFR (Non-Af Amer)  > 60    


 


POC Glucose (mg/dL)   122 H   ()  mg/dL


 


Random Glucose  114 H    ()  mg/dL


 


Calcium  7.6 L    (8.4-10.5)  mg/dL


 


Phosphorus     (2.5-4.5)  mg/dL


 


Magnesium  1.7    (1.7-2.2)  mg/dL


 


Total Bilirubin  6.2 H    (0.2-1.3)  mg/dL


 


AST  30    (14-36)  U/L


 


ALT  34    (7-56)  U/L


 


Alkaline Phosphatase  123    ()  U/L


 


Lactate Dehydrogenase     (333-699)  U/L


 


Total Protein  5.4 L    (5.8-8.3)  g/dL


 


Albumin  2.2 L    (3.0-4.8)  g/dL


 


Globulin  3.2    gm/dL


 


Albumin/Globulin Ratio  0.7 L    (1.1-1.8)  


 


Procalcitonin     (0.19-0.49)  NG/ML


 


Venous Blood Potassium    3.1 L  (3.6-5.2)  mmol/L














  06/07/18 06/06/18 Range/Units





  11:43 19:18 


 


WBC    (4.5-11.0)  10^3/ul


 


RBC    (3.5-6.1)  10^6/uL


 


Hgb    (12.0-16.0)  g/dL


 


Hct    (36.0-48.0)  %


 


MCV    (80.0-105.0)  fl


 


MCH    (25.0-35.0)  pg


 


MCHC    (31.0-37.0)  g/dl


 


RDW    (11.5-14.5)  %


 


Plt Count    (120.0-450.0)  10^3/uL


 


Gran %    (50.0-68.0)  %


 


Lymph % (Auto)    (22.0-35.0)  %


 


Mono % (Auto)    (1.0-6.0)  %


 


Eos % (Auto)    (1.5-5.0)  %


 


Baso % (Auto)    (0.0-3.0)  %


 


Gran #    (1.4-6.5)  


 


Lymph # (Auto)    (1.2-3.4)  


 


Mono # (Auto)    (0.1-0.6)  


 


Eos # (Auto)    (0.0-0.7)  


 


Baso # (Auto)    (0.0-2.0)  K/mm3


 


pO2    (30-55)  mm/Hg


 


VBG pH    (7.32-7.43)  


 


VBG pCO2    (40-60)  


 


VBG HCO3    (21-28)  mmol/l


 


VBG Total CO2    (22-28)  mmol.L


 


VBG O2 Sat (Calc)    (40-65)  %


 


VBG Base Excess    (0.0-2.0)  mmol/L


 


VBG Potassium    (3.6-5.2)  mmol/L


 


Sodium    (132-148)  mmol/L


 


Chloride    ()  mmol/L


 


Glucose    ()  mg/dl


 


Lactate    (0.7-2.1)  mmol/L


 


FiO2    %


 


Potassium    (3.6-5.0)  mmol/L


 


Carbon Dioxide    (21-33)  mmol/L


 


Anion Gap    (10-20)  


 


BUN    (7-21)  mg/dL


 


Creatinine    (0.7-1.2)  mg/dl


 


Est GFR (African Amer)    


 


Est GFR (Non-Af Amer)    


 


POC Glucose (mg/dL)  82   ()  mg/dL


 


Random Glucose    ()  mg/dL


 


Calcium    (8.4-10.5)  mg/dL


 


Phosphorus    (2.5-4.5)  mg/dL


 


Magnesium    (1.7-2.2)  mg/dL


 


Total Bilirubin    (0.2-1.3)  mg/dL


 


AST    (14-36)  U/L


 


ALT    (7-56)  U/L


 


Alkaline Phosphatase    ()  U/L


 


Lactate Dehydrogenase    (333-699)  U/L


 


Total Protein    (5.8-8.3)  g/dL


 


Albumin    (3.0-4.8)  g/dL


 


Globulin    gm/dL


 


Albumin/Globulin Ratio    (1.1-1.8)  


 


Procalcitonin   1.69 H  (0.19-0.49)  NG/ML


 


Venous Blood Potassium    (3.6-5.2)  mmol/L








 Laboratory Results - last 24 hr











  06/06/18 06/07/18 06/07/18





  19:18 11:43 11:59


 


WBC   


 


RBC   


 


Hgb   


 


Hct   


 


MCV   


 


MCH   


 


MCHC   


 


RDW   


 


Plt Count   


 


Gran %   


 


Lymph % (Auto)   


 


Mono % (Auto)   


 


Eos % (Auto)   


 


Baso % (Auto)   


 


Gran #   


 


Lymph # (Auto)   


 


Mono # (Auto)   


 


Eos # (Auto)   


 


Baso # (Auto)   


 


pO2    68 H


 


VBG pH    7.41


 


VBG pCO2    32.0 L


 


VBG HCO3    20.3 L


 


VBG Total CO2    21.3 L


 


VBG O2 Sat (Calc)    98.1 H


 


VBG Base Excess    -3.4 L


 


VBG Potassium    3.1 L


 


Sodium    137.0


 


Chloride    110.0 H


 


Glucose    78


 


Lactate    2.2 H


 


FiO2    21.0


 


Potassium   


 


Carbon Dioxide   


 


Anion Gap   


 


BUN   


 


Creatinine   


 


Est GFR ( Amer)   


 


Est GFR (Non-Af Amer)   


 


POC Glucose (mg/dL)   82 


 


Random Glucose   


 


Calcium   


 


Phosphorus   


 


Magnesium   


 


Total Bilirubin   


 


AST   


 


ALT   


 


Alkaline Phosphatase   


 


Lactate Dehydrogenase   


 


Total Protein   


 


Albumin   


 


Globulin   


 


Albumin/Globulin Ratio   


 


Procalcitonin  1.69 H  


 


Venous Blood Potassium    3.1 L














  06/07/18 06/07/18 06/07/18





  16:01 16:19 19:56


 


WBC   


 


RBC   


 


Hgb   


 


Hct   


 


MCV   


 


MCH   


 


MCHC   


 


RDW   


 


Plt Count   


 


Gran %   


 


Lymph % (Auto)   


 


Mono % (Auto)   


 


Eos % (Auto)   


 


Baso % (Auto)   


 


Gran #   


 


Lymph # (Auto)   


 


Mono # (Auto)   


 


Eos # (Auto)   


 


Baso # (Auto)   


 


pO2   


 


VBG pH   


 


VBG pCO2   


 


VBG HCO3   


 


VBG Total CO2   


 


VBG O2 Sat (Calc)   


 


VBG Base Excess   


 


VBG Potassium   


 


Sodium   138 


 


Chloride   110 H 


 


Glucose   


 


Lactate   


 


FiO2   


 


Potassium   3.0 L 


 


Carbon Dioxide   17 L 


 


Anion Gap   14 


 


BUN   13 


 


Creatinine   0.5 L 


 


Est GFR ( Amer)   > 60 


 


Est GFR (Non-Af Amer)   > 60 


 


POC Glucose (mg/dL)  122 H   98


 


Random Glucose   114 H 


 


Calcium   7.6 L 


 


Phosphorus   


 


Magnesium   1.7 


 


Total Bilirubin   6.2 H 


 


AST   30 


 


ALT   34 


 


Alkaline Phosphatase   123 


 


Lactate Dehydrogenase   


 


Total Protein   5.4 L 


 


Albumin   2.2 L 


 


Globulin   3.2 


 


Albumin/Globulin Ratio   0.7 L 


 


Procalcitonin   


 


Venous Blood Potassium   














  06/07/18 06/08/18 06/08/18





  23:56 03:59 05:30


 


WBC   


 


RBC   


 


Hgb   


 


Hct   


 


MCV   


 


MCH   


 


MCHC   


 


RDW   


 


Plt Count   


 


Gran %   


 


Lymph % (Auto)   


 


Mono % (Auto)   


 


Eos % (Auto)   


 


Baso % (Auto)   


 


Gran #   


 


Lymph # (Auto)   


 


Mono # (Auto)   


 


Eos # (Auto)   


 


Baso # (Auto)   


 


pO2   


 


VBG pH   


 


VBG pCO2   


 


VBG HCO3   


 


VBG Total CO2   


 


VBG O2 Sat (Calc)   


 


VBG Base Excess   


 


VBG Potassium   


 


Sodium   


 


Chloride   


 


Glucose   


 


Lactate   


 


FiO2   


 


Potassium   


 


Carbon Dioxide   


 


Anion Gap   


 


BUN   


 


Creatinine   


 


Est GFR ( Amer)   


 


Est GFR (Non-Af Amer)   


 


POC Glucose (mg/dL)  110  89 


 


Random Glucose   


 


Calcium   


 


Phosphorus   


 


Magnesium   


 


Total Bilirubin   


 


AST   


 


ALT   


 


Alkaline Phosphatase   


 


Lactate Dehydrogenase    390


 


Total Protein   


 


Albumin   


 


Globulin   


 


Albumin/Globulin Ratio   


 


Procalcitonin   


 


Venous Blood Potassium   














  06/08/18 06/08/18 06/08/18





  05:40 05:40 07:32


 


WBC  4.2 L D  


 


RBC  2.78 L  


 


Hgb  8.7 L  


 


Hct  25.7 L  


 


MCV  92.4  


 


MCH  31.3  


 


MCHC  33.9  


 


RDW  19.6 H  


 


Plt Count  73 L  


 


Gran %  39.8 L  


 


Lymph % (Auto)  21.1 L  


 


Mono % (Auto)  38.2 H  


 


Eos % (Auto)  0.2 L  


 


Baso % (Auto)  0.7  


 


Gran #  1.68  


 


Lymph # (Auto)  0.9 L  


 


Mono # (Auto)  1.6 H  


 


Eos # (Auto)  0.0  


 


Baso # (Auto)  0.03  


 


pO2   


 


VBG pH   


 


VBG pCO2   


 


VBG HCO3   


 


VBG Total CO2   


 


VBG O2 Sat (Calc)   


 


VBG Base Excess   


 


VBG Potassium   


 


Sodium   


 


Chloride   


 


Glucose   


 


Lactate   


 


FiO2   


 


Potassium   


 


Carbon Dioxide   


 


Anion Gap   


 


BUN   


 


Creatinine   


 


Est GFR ( Amer)   


 


Est GFR (Non-Af Amer)   


 


POC Glucose (mg/dL)    82


 


Random Glucose   


 


Calcium   


 


Phosphorus   1.7 L 


 


Magnesium   


 


Total Bilirubin   


 


AST   


 


ALT   


 


Alkaline Phosphatase   


 


Lactate Dehydrogenase   


 


Total Protein   


 


Albumin   


 


Globulin   


 


Albumin/Globulin Ratio   


 


Procalcitonin   


 


Venous Blood Potassium   














  06/08/18 06/08/18





  08:00 09:00


 


WBC  


 


RBC  


 


Hgb  


 


Hct  


 


MCV  


 


MCH  


 


MCHC  


 


RDW  


 


Plt Count  


 


Gran %  


 


Lymph % (Auto)  


 


Mono % (Auto)  


 


Eos % (Auto)  


 


Baso % (Auto)  


 


Gran #  


 


Lymph # (Auto)  


 


Mono # (Auto)  


 


Eos # (Auto)  


 


Baso # (Auto)  


 


pO2   57 H


 


VBG pH   7.36


 


VBG pCO2   33.0 L


 


VBG HCO3   18.6 L


 


VBG Total CO2   19.6 L


 


VBG O2 Sat (Calc)   93.9 H


 


VBG Base Excess   -5.9 L


 


VBG Potassium   2.8 L


 


Sodium  141  138.0


 


Chloride  112 H  113.0 H


 


Glucose   105


 


Lactate   2.7 H


 


FiO2   21.0


 


Potassium  3.0 L 


 


Carbon Dioxide  17 L 


 


Anion Gap  14 


 


BUN  11 


 


Creatinine  0.5 L 


 


Est GFR ( Amer)  > 60 


 


Est GFR (Non-Af Amer)  > 60 


 


POC Glucose (mg/dL)  


 


Random Glucose  85 


 


Calcium  7.8 L 


 


Phosphorus  


 


Magnesium  1.5 L 


 


Total Bilirubin  5.7 H 


 


AST  36 


 


ALT  36 


 


Alkaline Phosphatase  150 H D 


 


Lactate Dehydrogenase  


 


Total Protein  5.5 L 


 


Albumin  2.2 L 


 


Globulin  3.3 


 


Albumin/Globulin Ratio  0.7 L 


 


Procalcitonin  


 


Venous Blood Potassium   2.8 L











Fingerstick Blood Sugar Results: 89





Critical Care Progress Note





- Nutrition


Nutrition: 


 Nutrition











 Category Date Time Status


 


 Dysphagia/Modified Consistency Diet [DIET] Diets  06/08/18 Breakfast Ordered














Assessment/Plan





- Assessment and Plan (Free Text)


Plan: 





Ms Ricci, 73 female with PMHx COPD, metastatic cholangiocarcinoma, on chemo (

last wed) followed by Dr. Romano


Hx primary sclerosing cholangitis s/p biliary stent placement, ERCP


Hx liver cirrhosis, ulcerative colitis, giant cell arteritis, RA, 


C/o diarrhea and jaundice x 7d. 


Pt is admitted to ICU for neutropenic fever


She is found to have pancytopenic with coagulopathy


CT abdomen/Pelvis showed segmental colitis at proximal, mid large bowel, and 

Left hemicolon. No abscess. No free air. (+) gross intrahepatic billiary 

dilatation with lengthy stent radiating 





A: Neutropenic fever, with pancytopenia and coagulopathy due to chemo-induced 

vs sepsis


   Sepsis likely due to Pancolitis. Oral Thrush


   Jaundice likely chemo-induced biliary obstruction R/o hemolytic anemia


   Hx COPD





Neuro - maintain nomothermia





Pulm


 - Fluticasone/Salumedrol


 - Duoneb PRN


 


Card - On monitor HR/BP





GI - Odynophagia. Finely chopped/low microbial diet 


      Viscous lidocaine


       No plan to procedure. Medical managmeent





 - Urinary incontience





Endo - Maintain glucose 140-180





Heme - No DIC (Fibrinogen, FDP nl). Pending smear, Haptoglobin, Peg





ID - Pancolitis on Vancomycin, Merrem, Flagyl


   - PO vanco Pending C.diff result





PVX


   - protonix, SCD





Dispo plan


 - Patient made DNR/DNI


 - Transferred to tele





s/r/d/w Dr Ried





<Samir Reid - Last Filed: 06/08/18 17:33>





CCU Objective





- Vital Signs / Intake & Output


Vital Signs (Last 4 hours): 


Vital Signs











  Pulse Resp BP Pulse Ox


 


 06/08/18 14:31    114/55 L 


 


 06/08/18 14:29  99 H  22   98


 


 06/08/18 14:20  100 H  22   98


 


 06/08/18 14:10  96 H  15   97


 


 06/08/18 14:00  97 H  17  105/47 L  97


 


 06/08/18 13:50  98 H  19   98


 


 06/08/18 13:40  99 H  22   98














- Medications


Active Medications: 


Active Medications











Generic Name Dose Route Start Last Admin





  Trade Name Freq  PRN Reason Stop Dose Admin


 


Albuterol/Ipratropium  3 ml  06/08/18 08:51  





  Duoneb 3 Mg/0.5 Mg (3 Ml) Ud  IH   





  [id number] PRN   





  Shortness of Breath   


 


Arformoterol Tartrate  15 mcg  06/08/18 20:00  





  Brovana  IH   





  [id number] EDISON   


 


Budesonide  0.5 mg  06/08/18 20:00  





  Pulmicort Respules  IH   





  [id number] EDISON   


 


Al Hydrox/Mg Hydrox/  0 ml  06/06/18 17:26  06/07/18 04:04





Simethicone 30 ml/  PO   30 ml





Diphenhydramine HCl 75 mg/  Q2H PRN   Administration





Lidocaine 30 ml  Mouth/Throat Pain   


 


Metronidazole  500 mg in 100 mls @ 100 mls/hr  06/06/18 16:00  06/08/18 14:12





  Flagyl  IVPB   100 mls/hr





  Q8 EDISON   Administration





  Protocol   


 


Dextrose/Sodium Chloride  1,000 mls @ 100 mls/hr  06/06/18 16:00  06/08/18 10:34





  Dextrose 5%/0.9% Ns 1000 Ml  IV   100 mls/hr





  [id number] EDISON   Administration


 


Meropenem  50 mls @ 100 mls/hr  06/07/18 06:30  06/08/18 14:13





  Merrem Iv 1 Gm Premix  IVPB  06/14/18 06:31  100 mls/hr





  Q8 EDISON   Administration





  Protocol   


 


Lidocaine HCl  15 ml  06/07/18 16:30  06/08/18 09:06





  Lidocaine 2% Viscous  MM   15 ml





  Q3H PRN   Administration





  Other   


 


Nystatin  5 ml  06/06/18 18:00  06/08/18 14:15





  Nystatin Oral Susp  PO   5 ml





  QID EDISON   Administration


 


Pantoprazole Sodium  40 mg  06/07/18 10:00  06/08/18 10:33





  Protonix Inj  IVP   40 mg





  DAILY EDISON   Administration


 


Vancomycin HCl  125 mg  06/07/18 14:00  06/08/18 14:09





  Vancocin 25 Mg/Ml (Oral Use)  PO   125 mg





  QID EDISON   Administration





  Protocol   














- Patient Studies


Lab Studies: 


 Microbiology Studies











 06/06/18 22:22 MRSA Culture (Admit) - Final





 [facility]    MRSA NOT DETECTED


 


 06/08/18 09:00 C. difficile Antigen & Toxin A,B (M - Final





 Stool 


 


 06/07/18 11:25 Blood Culture - Preliminary





 Blood    NO GROWTH AFTER 24 HOURS


 


 06/07/18 11:00 Blood Culture - Preliminary





 Blood    NO GROWTH AFTER 24 HOURS








 Lab Studies











  06/08/18 06/08/18 06/08/18 Range/Units





  15:40 13:30 11:19 


 


WBC     (4.5-11.0)  10^3/ul


 


RBC     (3.5-6.1)  10^6/uL


 


Hgb     (12.0-16.0)  g/dL


 


Hct     (36.0-48.0)  %


 


MCV     (80.0-105.0)  fl


 


MCH     (25.0-35.0)  pg


 


MCHC     (31.0-37.0)  g/dl


 


RDW     (11.5-14.5)  %


 


Plt Count     (120.0-450.0)  10^3/uL


 


Gran %     (50.0-68.0)  %


 


Lymph % (Auto)     (22.0-35.0)  %


 


Mono % (Auto)     (1.0-6.0)  %


 


Eos % (Auto)     (1.5-5.0)  %


 


Baso % (Auto)     (0.0-3.0)  %


 


Gran #     (1.4-6.5)  


 


Lymph # (Auto)     (1.2-3.4)  


 


Mono # (Auto)     (0.1-0.6)  


 


Eos # (Auto)     (0.0-0.7)  


 


Baso # (Auto)     (0.0-2.0)  K/mm3


 


Haptoglobin     (30.0-200.0)  mg/dL


 


PT     (9.4-12.5)  SECONDS


 


INR     (0.93-1.08)  


 


APTT     (25.1-36.5)  Seconds


 


pO2   107 H   (30-55)  mm/Hg


 


VBG pH   7.38   (7.32-7.43)  


 


VBG pCO2   31.0 L   (40-60)  


 


VBG HCO3   18.3 L   (21-28)  mmol/l


 


VBG Total CO2   19.3 L   (22-28)  mmol.L


 


VBG O2 Sat (Calc)   100.0 H   (40-65)  %


 


VBG Base Excess   -5.7 L   (0.0-2.0)  mmol/L


 


VBG Potassium   3.1 L   (3.6-5.2)  mmol/L


 


Glucose   100   ()  mg/dl


 


Lactate   3.1 H   (0.7-2.1)  mmol/L


 


FiO2   21.0   %


 


Sodium   137.0   (132-148)  mmol/L


 


Potassium     (3.6-5.0)  mmol/L


 


Chloride   114.0 H   ()  mmol/L


 


Carbon Dioxide     (21-33)  mmol/L


 


Anion Gap     (10-20)  


 


BUN     (7-21)  mg/dL


 


Creatinine     (0.7-1.2)  mg/dl


 


Est GFR (African Amer)     


 


Est GFR (Non-Af Amer)     


 


POC Glucose (mg/dL)  125 H   113 H  ()  mg/dL


 


Random Glucose     ()  mg/dL


 


Calcium     (8.4-10.5)  mg/dL


 


Phosphorus     (2.5-4.5)  mg/dL


 


Magnesium     (1.7-2.2)  mg/dL


 


Total Bilirubin     (0.2-1.3)  mg/dL


 


Direct Bilirubin     (0.0-0.4)  mg/dL


 


AST     (14-36)  U/L


 


ALT     (7-56)  U/L


 


Alkaline Phosphatase     ()  U/L


 


Lactate Dehydrogenase     (333-699)  U/L


 


Total Protein     (5.8-8.3)  g/dL


 


Albumin     (3.0-4.8)  g/dL


 


Globulin     gm/dL


 


Albumin/Globulin Ratio     (1.1-1.8)  


 


Venous Blood Potassium   3.1 L   (3.6-5.2)  mmol/L


 


ANATOLIY, Poly Interpret     (NEGATIVE)  














  06/08/18 06/08/18 06/08/18 Range/Units





  09:00 09:00 09:00 


 


WBC     (4.5-11.0)  10^3/ul


 


RBC     (3.5-6.1)  10^6/uL


 


Hgb     (12.0-16.0)  g/dL


 


Hct     (36.0-48.0)  %


 


MCV     (80.0-105.0)  fl


 


MCH     (25.0-35.0)  pg


 


MCHC     (31.0-37.0)  g/dl


 


RDW     (11.5-14.5)  %


 


Plt Count     (120.0-450.0)  10^3/uL


 


Gran %     (50.0-68.0)  %


 


Lymph % (Auto)     (22.0-35.0)  %


 


Mono % (Auto)     (1.0-6.0)  %


 


Eos % (Auto)     (1.5-5.0)  %


 


Baso % (Auto)     (0.0-3.0)  %


 


Gran #     (1.4-6.5)  


 


Lymph # (Auto)     (1.2-3.4)  


 


Mono # (Auto)     (0.1-0.6)  


 


Eos # (Auto)     (0.0-0.7)  


 


Baso # (Auto)     (0.0-2.0)  K/mm3


 


Haptoglobin   123.5   (30.0-200.0)  mg/dL


 


PT  24.8 H    (9.4-12.5)  SECONDS


 


INR  2.12 H    (0.93-1.08)  


 


APTT  30.5    (25.1-36.5)  Seconds


 


pO2    57 H  (30-55)  mm/Hg


 


VBG pH    7.36  (7.32-7.43)  


 


VBG pCO2    33.0 L  (40-60)  


 


VBG HCO3    18.6 L  (21-28)  mmol/l


 


VBG Total CO2    19.6 L  (22-28)  mmol.L


 


VBG O2 Sat (Calc)    93.9 H  (40-65)  %


 


VBG Base Excess    -5.9 L  (0.0-2.0)  mmol/L


 


VBG Potassium    2.8 L  (3.6-5.2)  mmol/L


 


Glucose    105  ()  mg/dl


 


Lactate    2.7 H  (0.7-2.1)  mmol/L


 


FiO2    21.0  %


 


Sodium    138.0  (132-148)  mmol/L


 


Potassium     (3.6-5.0)  mmol/L


 


Chloride    113.0 H  ()  mmol/L


 


Carbon Dioxide     (21-33)  mmol/L


 


Anion Gap     (10-20)  


 


BUN     (7-21)  mg/dL


 


Creatinine     (0.7-1.2)  mg/dl


 


Est GFR (African Amer)     


 


Est GFR (Non-Af Amer)     


 


POC Glucose (mg/dL)     ()  mg/dL


 


Random Glucose     ()  mg/dL


 


Calcium     (8.4-10.5)  mg/dL


 


Phosphorus     (2.5-4.5)  mg/dL


 


Magnesium     (1.7-2.2)  mg/dL


 


Total Bilirubin     (0.2-1.3)  mg/dL


 


Direct Bilirubin     (0.0-0.4)  mg/dL


 


AST     (14-36)  U/L


 


ALT     (7-56)  U/L


 


Alkaline Phosphatase     ()  U/L


 


Lactate Dehydrogenase     (333-699)  U/L


 


Total Protein     (5.8-8.3)  g/dL


 


Albumin     (3.0-4.8)  g/dL


 


Globulin     gm/dL


 


Albumin/Globulin Ratio     (1.1-1.8)  


 


Venous Blood Potassium    2.8 L  (3.6-5.2)  mmol/L


 


ANATOLIY, Poly Interpret     (NEGATIVE)  














  06/08/18 06/08/18 06/08/18 Range/Units





  08:00 08:00 07:32 


 


WBC     (4.5-11.0)  10^3/ul


 


RBC     (3.5-6.1)  10^6/uL


 


Hgb     (12.0-16.0)  g/dL


 


Hct     (36.0-48.0)  %


 


MCV     (80.0-105.0)  fl


 


MCH     (25.0-35.0)  pg


 


MCHC     (31.0-37.0)  g/dl


 


RDW     (11.5-14.5)  %


 


Plt Count     (120.0-450.0)  10^3/uL


 


Gran %     (50.0-68.0)  %


 


Lymph % (Auto)     (22.0-35.0)  %


 


Mono % (Auto)     (1.0-6.0)  %


 


Eos % (Auto)     (1.5-5.0)  %


 


Baso % (Auto)     (0.0-3.0)  %


 


Gran #     (1.4-6.5)  


 


Lymph # (Auto)     (1.2-3.4)  


 


Mono # (Auto)     (0.1-0.6)  


 


Eos # (Auto)     (0.0-0.7)  


 


Baso # (Auto)     (0.0-2.0)  K/mm3


 


Haptoglobin     (30.0-200.0)  mg/dL


 


PT     (9.4-12.5)  SECONDS


 


INR     (0.93-1.08)  


 


APTT     (25.1-36.5)  Seconds


 


pO2     (30-55)  mm/Hg


 


VBG pH     (7.32-7.43)  


 


VBG pCO2     (40-60)  


 


VBG HCO3     (21-28)  mmol/l


 


VBG Total CO2     (22-28)  mmol.L


 


VBG O2 Sat (Calc)     (40-65)  %


 


VBG Base Excess     (0.0-2.0)  mmol/L


 


VBG Potassium     (3.6-5.2)  mmol/L


 


Glucose     ()  mg/dl


 


Lactate     (0.7-2.1)  mmol/L


 


FiO2     %


 


Sodium   141   (132-148)  mmol/L


 


Potassium   3.0 L   (3.6-5.0)  mmol/L


 


Chloride   112 H   ()  mmol/L


 


Carbon Dioxide   17 L   (21-33)  mmol/L


 


Anion Gap   14   (10-20)  


 


BUN   11   (7-21)  mg/dL


 


Creatinine   0.5 L   (0.7-1.2)  mg/dl


 


Est GFR (African Amer)   > 60   


 


Est GFR (Non-Af Amer)   > 60   


 


POC Glucose (mg/dL)    82  ()  mg/dL


 


Random Glucose   85   ()  mg/dL


 


Calcium   7.8 L   (8.4-10.5)  mg/dL


 


Phosphorus     (2.5-4.5)  mg/dL


 


Magnesium   1.5 L   (1.7-2.2)  mg/dL


 


Total Bilirubin   5.7 H   (0.2-1.3)  mg/dL


 


Direct Bilirubin  4.2 H    (0.0-0.4)  mg/dL


 


AST   36   (14-36)  U/L


 


ALT   36   (7-56)  U/L


 


Alkaline Phosphatase   150 H D   ()  U/L


 


Lactate Dehydrogenase     (333-699)  U/L


 


Total Protein   5.5 L   (5.8-8.3)  g/dL


 


Albumin   2.2 L   (3.0-4.8)  g/dL


 


Globulin   3.3   gm/dL


 


Albumin/Globulin Ratio   0.7 L   (1.1-1.8)  


 


Venous Blood Potassium     (3.6-5.2)  mmol/L


 


ANATOLIY, Poly Interpret     (NEGATIVE)  














  06/08/18 06/08/18 06/08/18 Range/Units





  07:00 05:40 05:40 


 


WBC    4.2 L D  (4.5-11.0)  10^3/ul


 


RBC    2.78 L  (3.5-6.1)  10^6/uL


 


Hgb    8.7 L  (12.0-16.0)  g/dL


 


Hct    25.7 L  (36.0-48.0)  %


 


MCV    92.4  (80.0-105.0)  fl


 


MCH    31.3  (25.0-35.0)  pg


 


MCHC    33.9  (31.0-37.0)  g/dl


 


RDW    19.6 H  (11.5-14.5)  %


 


Plt Count    73 L  (120.0-450.0)  10^3/uL


 


Gran %    39.8 L  (50.0-68.0)  %


 


Lymph % (Auto)    21.1 L  (22.0-35.0)  %


 


Mono % (Auto)    38.2 H  (1.0-6.0)  %


 


Eos % (Auto)    0.2 L  (1.5-5.0)  %


 


Baso % (Auto)    0.7  (0.0-3.0)  %


 


Gran #    1.68  (1.4-6.5)  


 


Lymph # (Auto)    0.9 L  (1.2-3.4)  


 


Mono # (Auto)    1.6 H  (0.1-0.6)  


 


Eos # (Auto)    0.0  (0.0-0.7)  


 


Baso # (Auto)    0.03  (0.0-2.0)  K/mm3


 


Haptoglobin     (30.0-200.0)  mg/dL


 


PT     (9.4-12.5)  SECONDS


 


INR     (0.93-1.08)  


 


APTT     (25.1-36.5)  Seconds


 


pO2     (30-55)  mm/Hg


 


VBG pH     (7.32-7.43)  


 


VBG pCO2     (40-60)  


 


VBG HCO3     (21-28)  mmol/l


 


VBG Total CO2     (22-28)  mmol.L


 


VBG O2 Sat (Calc)     (40-65)  %


 


VBG Base Excess     (0.0-2.0)  mmol/L


 


VBG Potassium     (3.6-5.2)  mmol/L


 


Glucose     ()  mg/dl


 


Lactate     (0.7-2.1)  mmol/L


 


FiO2     %


 


Sodium     (132-148)  mmol/L


 


Potassium     (3.6-5.0)  mmol/L


 


Chloride     ()  mmol/L


 


Carbon Dioxide     (21-33)  mmol/L


 


Anion Gap     (10-20)  


 


BUN     (7-21)  mg/dL


 


Creatinine     (0.7-1.2)  mg/dl


 


Est GFR (African Amer)     


 


Est GFR (Non-Af Amer)     


 


POC Glucose (mg/dL)     ()  mg/dL


 


Random Glucose     ()  mg/dL


 


Calcium     (8.4-10.5)  mg/dL


 


Phosphorus   1.7 L   (2.5-4.5)  mg/dL


 


Magnesium     (1.7-2.2)  mg/dL


 


Total Bilirubin     (0.2-1.3)  mg/dL


 


Direct Bilirubin     (0.0-0.4)  mg/dL


 


AST     (14-36)  U/L


 


ALT     (7-56)  U/L


 


Alkaline Phosphatase     ()  U/L


 


Lactate Dehydrogenase     (333-699)  U/L


 


Total Protein     (5.8-8.3)  g/dL


 


Albumin     (3.0-4.8)  g/dL


 


Globulin     gm/dL


 


Albumin/Globulin Ratio     (1.1-1.8)  


 


Venous Blood Potassium     (3.6-5.2)  mmol/L


 


ANATOLIY, Poly Interpret  Negative    (NEGATIVE)  














  06/08/18 06/08/18 06/07/18 Range/Units





  05:30 03:59 23:56 


 


WBC     (4.5-11.0)  10^3/ul


 


RBC     (3.5-6.1)  10^6/uL


 


Hgb     (12.0-16.0)  g/dL


 


Hct     (36.0-48.0)  %


 


MCV     (80.0-105.0)  fl


 


MCH     (25.0-35.0)  pg


 


MCHC     (31.0-37.0)  g/dl


 


RDW     (11.5-14.5)  %


 


Plt Count     (120.0-450.0)  10^3/uL


 


Gran %     (50.0-68.0)  %


 


Lymph % (Auto)     (22.0-35.0)  %


 


Mono % (Auto)     (1.0-6.0)  %


 


Eos % (Auto)     (1.5-5.0)  %


 


Baso % (Auto)     (0.0-3.0)  %


 


Gran #     (1.4-6.5)  


 


Lymph # (Auto)     (1.2-3.4)  


 


Mono # (Auto)     (0.1-0.6)  


 


Eos # (Auto)     (0.0-0.7)  


 


Baso # (Auto)     (0.0-2.0)  K/mm3


 


Haptoglobin     (30.0-200.0)  mg/dL


 


PT     (9.4-12.5)  SECONDS


 


INR     (0.93-1.08)  


 


APTT     (25.1-36.5)  Seconds


 


pO2     (30-55)  mm/Hg


 


VBG pH     (7.32-7.43)  


 


VBG pCO2     (40-60)  


 


VBG HCO3     (21-28)  mmol/l


 


VBG Total CO2     (22-28)  mmol.L


 


VBG O2 Sat (Calc)     (40-65)  %


 


VBG Base Excess     (0.0-2.0)  mmol/L


 


VBG Potassium     (3.6-5.2)  mmol/L


 


Glucose     ()  mg/dl


 


Lactate     (0.7-2.1)  mmol/L


 


FiO2     %


 


Sodium     (132-148)  mmol/L


 


Potassium     (3.6-5.0)  mmol/L


 


Chloride     ()  mmol/L


 


Carbon Dioxide     (21-33)  mmol/L


 


Anion Gap     (10-20)  


 


BUN     (7-21)  mg/dL


 


Creatinine     (0.7-1.2)  mg/dl


 


Est GFR (African Amer)     


 


Est GFR (Non-Af Amer)     


 


POC Glucose (mg/dL)   89  110  ()  mg/dL


 


Random Glucose     ()  mg/dL


 


Calcium     (8.4-10.5)  mg/dL


 


Phosphorus     (2.5-4.5)  mg/dL


 


Magnesium     (1.7-2.2)  mg/dL


 


Total Bilirubin     (0.2-1.3)  mg/dL


 


Direct Bilirubin     (0.0-0.4)  mg/dL


 


AST     (14-36)  U/L


 


ALT     (7-56)  U/L


 


Alkaline Phosphatase     ()  U/L


 


Lactate Dehydrogenase  390    (333-699)  U/L


 


Total Protein     (5.8-8.3)  g/dL


 


Albumin     (3.0-4.8)  g/dL


 


Globulin     gm/dL


 


Albumin/Globulin Ratio     (1.1-1.8)  


 


Venous Blood Potassium     (3.6-5.2)  mmol/L


 


ANATOLIY, Poly Interpret     (NEGATIVE)  














  06/07/18 Range/Units





  19:56 


 


WBC   (4.5-11.0)  10^3/ul


 


RBC   (3.5-6.1)  10^6/uL


 


Hgb   (12.0-16.0)  g/dL


 


Hct   (36.0-48.0)  %


 


MCV   (80.0-105.0)  fl


 


MCH   (25.0-35.0)  pg


 


MCHC   (31.0-37.0)  g/dl


 


RDW   (11.5-14.5)  %


 


Plt Count   (120.0-450.0)  10^3/uL


 


Gran %   (50.0-68.0)  %


 


Lymph % (Auto)   (22.0-35.0)  %


 


Mono % (Auto)   (1.0-6.0)  %


 


Eos % (Auto)   (1.5-5.0)  %


 


Baso % (Auto)   (0.0-3.0)  %


 


Gran #   (1.4-6.5)  


 


Lymph # (Auto)   (1.2-3.4)  


 


Mono # (Auto)   (0.1-0.6)  


 


Eos # (Auto)   (0.0-0.7)  


 


Baso # (Auto)   (0.0-2.0)  K/mm3


 


Haptoglobin   (30.0-200.0)  mg/dL


 


PT   (9.4-12.5)  SECONDS


 


INR   (0.93-1.08)  


 


APTT   (25.1-36.5)  Seconds


 


pO2   (30-55)  mm/Hg


 


VBG pH   (7.32-7.43)  


 


VBG pCO2   (40-60)  


 


VBG HCO3   (21-28)  mmol/l


 


VBG Total CO2   (22-28)  mmol.L


 


VBG O2 Sat (Calc)   (40-65)  %


 


VBG Base Excess   (0.0-2.0)  mmol/L


 


VBG Potassium   (3.6-5.2)  mmol/L


 


Glucose   ()  mg/dl


 


Lactate   (0.7-2.1)  mmol/L


 


FiO2   %


 


Sodium   (132-148)  mmol/L


 


Potassium   (3.6-5.0)  mmol/L


 


Chloride   ()  mmol/L


 


Carbon Dioxide   (21-33)  mmol/L


 


Anion Gap   (10-20)  


 


BUN   (7-21)  mg/dL


 


Creatinine   (0.7-1.2)  mg/dl


 


Est GFR (African Amer)   


 


Est GFR (Non-Af Amer)   


 


POC Glucose (mg/dL)  98  ()  mg/dL


 


Random Glucose   ()  mg/dL


 


Calcium   (8.4-10.5)  mg/dL


 


Phosphorus   (2.5-4.5)  mg/dL


 


Magnesium   (1.7-2.2)  mg/dL


 


Total Bilirubin   (0.2-1.3)  mg/dL


 


Direct Bilirubin   (0.0-0.4)  mg/dL


 


AST   (14-36)  U/L


 


ALT   (7-56)  U/L


 


Alkaline Phosphatase   ()  U/L


 


Lactate Dehydrogenase   (333-699)  U/L


 


Total Protein   (5.8-8.3)  g/dL


 


Albumin   (3.0-4.8)  g/dL


 


Globulin   gm/dL


 


Albumin/Globulin Ratio   (1.1-1.8)  


 


Venous Blood Potassium   (3.6-5.2)  mmol/L


 


ANATOLIY, Poly Interpret   (NEGATIVE)  








 Laboratory Results - last 24 hr











  06/07/18 06/07/18 06/08/18





  19:56 23:56 03:59


 


WBC   


 


RBC   


 


Hgb   


 


Hct   


 


MCV   


 


MCH   


 


MCHC   


 


RDW   


 


Plt Count   


 


Gran %   


 


Lymph % (Auto)   


 


Mono % (Auto)   


 


Eos % (Auto)   


 


Baso % (Auto)   


 


Gran #   


 


Lymph # (Auto)   


 


Mono # (Auto)   


 


Eos # (Auto)   


 


Baso # (Auto)   


 


Haptoglobin   


 


PT   


 


INR   


 


APTT   


 


pO2   


 


VBG pH   


 


VBG pCO2   


 


VBG HCO3   


 


VBG Total CO2   


 


VBG O2 Sat (Calc)   


 


VBG Base Excess   


 


VBG Potassium   


 


Glucose   


 


Lactate   


 


FiO2   


 


Sodium   


 


Potassium   


 


Chloride   


 


Carbon Dioxide   


 


Anion Gap   


 


BUN   


 


Creatinine   


 


Est GFR ( Amer)   


 


Est GFR (Non-Af Amer)   


 


POC Glucose (mg/dL)  98  110  89


 


Random Glucose   


 


Calcium   


 


Phosphorus   


 


Magnesium   


 


Total Bilirubin   


 


Direct Bilirubin   


 


AST   


 


ALT   


 


Alkaline Phosphatase   


 


Lactate Dehydrogenase   


 


Total Protein   


 


Albumin   


 


Globulin   


 


Albumin/Globulin Ratio   


 


Venous Blood Potassium   


 


ANATOLIY, Poly Interpret   














  06/08/18 06/08/18 06/08/18





  05:30 05:40 05:40


 


WBC   4.2 L D 


 


RBC   2.78 L 


 


Hgb   8.7 L 


 


Hct   25.7 L 


 


MCV   92.4 


 


MCH   31.3 


 


MCHC   33.9 


 


RDW   19.6 H 


 


Plt Count   73 L 


 


Gran %   39.8 L 


 


Lymph % (Auto)   21.1 L 


 


Mono % (Auto)   38.2 H 


 


Eos % (Auto)   0.2 L 


 


Baso % (Auto)   0.7 


 


Gran #   1.68 


 


Lymph # (Auto)   0.9 L 


 


Mono # (Auto)   1.6 H 


 


Eos # (Auto)   0.0 


 


Baso # (Auto)   0.03 


 


Haptoglobin   


 


PT   


 


INR   


 


APTT   


 


pO2   


 


VBG pH   


 


VBG pCO2   


 


VBG HCO3   


 


VBG Total CO2   


 


VBG O2 Sat (Calc)   


 


VBG Base Excess   


 


VBG Potassium   


 


Glucose   


 


Lactate   


 


FiO2   


 


Sodium   


 


Potassium   


 


Chloride   


 


Carbon Dioxide   


 


Anion Gap   


 


BUN   


 


Creatinine   


 


Est GFR ( Amer)   


 


Est GFR (Non-Af Amer)   


 


POC Glucose (mg/dL)   


 


Random Glucose   


 


Calcium   


 


Phosphorus    1.7 L


 


Magnesium   


 


Total Bilirubin   


 


Direct Bilirubin   


 


AST   


 


ALT   


 


Alkaline Phosphatase   


 


Lactate Dehydrogenase  390  


 


Total Protein   


 


Albumin   


 


Globulin   


 


Albumin/Globulin Ratio   


 


Venous Blood Potassium   


 


ANATOLIY, Poly Interpret   














  06/08/18 06/08/18 06/08/18





  07:00 07:32 08:00


 


WBC   


 


RBC   


 


Hgb   


 


Hct   


 


MCV   


 


MCH   


 


MCHC   


 


RDW   


 


Plt Count   


 


Gran %   


 


Lymph % (Auto)   


 


Mono % (Auto)   


 


Eos % (Auto)   


 


Baso % (Auto)   


 


Gran #   


 


Lymph # (Auto)   


 


Mono # (Auto)   


 


Eos # (Auto)   


 


Baso # (Auto)   


 


Haptoglobin   


 


PT   


 


INR   


 


APTT   


 


pO2   


 


VBG pH   


 


VBG pCO2   


 


VBG HCO3   


 


VBG Total CO2   


 


VBG O2 Sat (Calc)   


 


VBG Base Excess   


 


VBG Potassium   


 


Glucose   


 


Lactate   


 


FiO2   


 


Sodium    141


 


Potassium    3.0 L


 


Chloride    112 H


 


Carbon Dioxide    17 L


 


Anion Gap    14


 


BUN    11


 


Creatinine    0.5 L


 


Est GFR ( Amer)    > 60


 


Est GFR (Non-Af Amer)    > 60


 


POC Glucose (mg/dL)   82 


 


Random Glucose    85


 


Calcium    7.8 L


 


Phosphorus   


 


Magnesium    1.5 L


 


Total Bilirubin    5.7 H


 


Direct Bilirubin   


 


AST    36


 


ALT    36


 


Alkaline Phosphatase    150 H D


 


Lactate Dehydrogenase   


 


Total Protein    5.5 L


 


Albumin    2.2 L


 


Globulin    3.3


 


Albumin/Globulin Ratio    0.7 L


 


Venous Blood Potassium   


 


ANATOLIY, Poly Interpret  Negative  














  06/08/18 06/08/18 06/08/18





  08:00 09:00 09:00


 


WBC   


 


RBC   


 


Hgb   


 


Hct   


 


MCV   


 


MCH   


 


MCHC   


 


RDW   


 


Plt Count   


 


Gran %   


 


Lymph % (Auto)   


 


Mono % (Auto)   


 


Eos % (Auto)   


 


Baso % (Auto)   


 


Gran #   


 


Lymph # (Auto)   


 


Mono # (Auto)   


 


Eos # (Auto)   


 


Baso # (Auto)   


 


Haptoglobin    123.5


 


PT   


 


INR   


 


APTT   


 


pO2   57 H 


 


VBG pH   7.36 


 


VBG pCO2   33.0 L 


 


VBG HCO3   18.6 L 


 


VBG Total CO2   19.6 L 


 


VBG O2 Sat (Calc)   93.9 H 


 


VBG Base Excess   -5.9 L 


 


VBG Potassium   2.8 L 


 


Glucose   105 


 


Lactate   2.7 H 


 


FiO2   21.0 


 


Sodium   138.0 


 


Potassium   


 


Chloride   113.0 H 


 


Carbon Dioxide   


 


Anion Gap   


 


BUN   


 


Creatinine   


 


Est GFR ( Amer)   


 


Est GFR (Non-Af Amer)   


 


POC Glucose (mg/dL)   


 


Random Glucose   


 


Calcium   


 


Phosphorus   


 


Magnesium   


 


Total Bilirubin   


 


Direct Bilirubin  4.2 H  


 


AST   


 


ALT   


 


Alkaline Phosphatase   


 


Lactate Dehydrogenase   


 


Total Protein   


 


Albumin   


 


Globulin   


 


Albumin/Globulin Ratio   


 


Venous Blood Potassium   2.8 L 


 


ANATOLIY, Poly Interpret   














  06/08/18 06/08/18 06/08/18





  09:00 11:19 13:30


 


WBC   


 


RBC   


 


Hgb   


 


Hct   


 


MCV   


 


MCH   


 


MCHC   


 


RDW   


 


Plt Count   


 


Gran %   


 


Lymph % (Auto)   


 


Mono % (Auto)   


 


Eos % (Auto)   


 


Baso % (Auto)   


 


Gran #   


 


Lymph # (Auto)   


 


Mono # (Auto)   


 


Eos # (Auto)   


 


Baso # (Auto)   


 


Haptoglobin   


 


PT  24.8 H  


 


INR  2.12 H  


 


APTT  30.5  


 


pO2    107 H


 


VBG pH    7.38


 


VBG pCO2    31.0 L


 


VBG HCO3    18.3 L


 


VBG Total CO2    19.3 L


 


VBG O2 Sat (Calc)    100.0 H


 


VBG Base Excess    -5.7 L


 


VBG Potassium    3.1 L


 


Glucose    100


 


Lactate    3.1 H


 


FiO2    21.0


 


Sodium    137.0


 


Potassium   


 


Chloride    114.0 H


 


Carbon Dioxide   


 


Anion Gap   


 


BUN   


 


Creatinine   


 


Est GFR ( Amer)   


 


Est GFR (Non-Af Amer)   


 


POC Glucose (mg/dL)   113 H 


 


Random Glucose   


 


Calcium   


 


Phosphorus   


 


Magnesium   


 


Total Bilirubin   


 


Direct Bilirubin   


 


AST   


 


ALT   


 


Alkaline Phosphatase   


 


Lactate Dehydrogenase   


 


Total Protein   


 


Albumin   


 


Globulin   


 


Albumin/Globulin Ratio   


 


Venous Blood Potassium    3.1 L


 


ANATOLIY, Poly Interpret   














  06/08/18





  15:40


 


WBC 


 


RBC 


 


Hgb 


 


Hct 


 


MCV 


 


MCH 


 


MCHC 


 


RDW 


 


Plt Count 


 


Gran % 


 


Lymph % (Auto) 


 


Mono % (Auto) 


 


Eos % (Auto) 


 


Baso % (Auto) 


 


Gran # 


 


Lymph # (Auto) 


 


Mono # (Auto) 


 


Eos # (Auto) 


 


Baso # (Auto) 


 


Haptoglobin 


 


PT 


 


INR 


 


APTT 


 


pO2 


 


VBG pH 


 


VBG pCO2 


 


VBG HCO3 


 


VBG Total CO2 


 


VBG O2 Sat (Calc) 


 


VBG Base Excess 


 


VBG Potassium 


 


Glucose 


 


Lactate 


 


FiO2 


 


Sodium 


 


Potassium 


 


Chloride 


 


Carbon Dioxide 


 


Anion Gap 


 


BUN 


 


Creatinine 


 


Est GFR ( Amer) 


 


Est GFR (Non-Af Amer) 


 


POC Glucose (mg/dL)  125 H


 


Random Glucose 


 


Calcium 


 


Phosphorus 


 


Magnesium 


 


Total Bilirubin 


 


Direct Bilirubin 


 


AST 


 


ALT 


 


Alkaline Phosphatase 


 


Lactate Dehydrogenase 


 


Total Protein 


 


Albumin 


 


Globulin 


 


Albumin/Globulin Ratio 


 


Venous Blood Potassium 


 


ANATOLIY, Poly Interpret 














Critical Care Progress Note





- Nutrition


Nutrition: 


 Nutrition











 Category Date Time Status


 


 Dysphagia/Modified Consistency Diet [DIET] Diets  06/08/18 Breakfast Ordered














Attending/Attestation





- Attestation


I have personally seen and examined this patient.: Yes


I have fully participated in the care of the patient.: Yes


I have reviewed all pertinent clinical information: Yes


Notes (Text): 





06/08/18 17:30


72 yo with neutropenic fever secondary to typhlitis, now substantially improved

, clinically, wbc-wise and subjectively. DNR/DNI status noted. 


diarrhea subsided. Ok to downgrade to tele





ccm time 40 min

## 2018-06-09 LAB
ALBUMIN SERPL-MCNC: 1.9 G/DL (ref 3–4.8)
ALBUMIN/GLOB SERPL: 0.6 {RATIO} (ref 1.1–1.8)
ALT SERPL-CCNC: 40 U/L (ref 7–56)
AST SERPL-CCNC: 61 U/L (ref 14–36)
BUN SERPL-MCNC: 10 MG/DL (ref 7–21)
CALCIUM SERPL-MCNC: 8 MG/DL (ref 8.4–10.5)
ERYTHROCYTE [DISTWIDTH] IN BLOOD BY AUTOMATED COUNT: 20.1 % (ref 11.5–14.5)
GFR NON-AFRICAN AMERICAN: > 60
HGB BLD-MCNC: 8.7 G/DL (ref 12–16)
MCH RBC QN AUTO: 31.3 PG (ref 25–35)
MCHC RBC AUTO-ENTMCNC: 33.6 G/DL (ref 31–37)
MCV RBC AUTO: 93.2 FL (ref 80–105)
PLATELET # BLD: 96 10^3/UL (ref 120–450)
RBC # BLD AUTO: 2.78 10^6/UL (ref 3.5–6.1)
WBC # BLD AUTO: 17.7 10^3/UL (ref 4.5–11)

## 2018-06-09 RX ADMIN — MEROPENEM SCH MLS/HR: 1 INJECTION INTRAVENOUS at 16:09

## 2018-06-09 RX ADMIN — NYSTATIN SCH: 100000 SUSPENSION ORAL at 17:03

## 2018-06-09 RX ADMIN — VANCOMYCIN HYDROCHLORIDE SCH MG: 500 INJECTION, POWDER, LYOPHILIZED, FOR SOLUTION INTRAVENOUS at 16:10

## 2018-06-09 RX ADMIN — BUDESONIDE SCH MG: 0.5 SUSPENSION RESPIRATORY (INHALATION) at 07:26

## 2018-06-09 RX ADMIN — IPRATROPIUM BROMIDE AND ALBUTEROL SULFATE PRN ML: .5; 3 SOLUTION RESPIRATORY (INHALATION) at 21:05

## 2018-06-09 RX ADMIN — NYSTATIN SCH: 100000 SUSPENSION ORAL at 09:21

## 2018-06-09 RX ADMIN — MEROPENEM SCH MLS/HR: 1 INJECTION INTRAVENOUS at 22:38

## 2018-06-09 RX ADMIN — BUDESONIDE SCH MG: 0.5 SUSPENSION RESPIRATORY (INHALATION) at 21:05

## 2018-06-09 RX ADMIN — NYSTATIN SCH: 100000 SUSPENSION ORAL at 16:17

## 2018-06-09 RX ADMIN — VANCOMYCIN HYDROCHLORIDE SCH MG: 500 INJECTION, POWDER, LYOPHILIZED, FOR SOLUTION INTRAVENOUS at 22:47

## 2018-06-09 RX ADMIN — METRONIDAZOLE SCH MLS/HR: 500 INJECTION, SOLUTION INTRAVENOUS at 05:45

## 2018-06-09 RX ADMIN — DEXTROSE AND SODIUM CHLORIDE SCH MLS/HR: 5; 900 INJECTION, SOLUTION INTRAVENOUS at 12:51

## 2018-06-09 RX ADMIN — NYSTATIN SCH ML: 100000 SUSPENSION ORAL at 22:38

## 2018-06-09 RX ADMIN — VANCOMYCIN HYDROCHLORIDE SCH MG: 500 INJECTION, POWDER, LYOPHILIZED, FOR SOLUTION INTRAVENOUS at 18:01

## 2018-06-09 RX ADMIN — VANCOMYCIN HYDROCHLORIDE SCH MG: 500 INJECTION, POWDER, LYOPHILIZED, FOR SOLUTION INTRAVENOUS at 10:13

## 2018-06-09 RX ADMIN — METRONIDAZOLE SCH MLS/HR: 500 INJECTION, SOLUTION INTRAVENOUS at 22:57

## 2018-06-09 RX ADMIN — IPRATROPIUM BROMIDE AND ALBUTEROL SULFATE PRN ML: .5; 3 SOLUTION RESPIRATORY (INHALATION) at 07:26

## 2018-06-09 RX ADMIN — MEROPENEM SCH MLS/HR: 1 INJECTION INTRAVENOUS at 05:46

## 2018-06-09 RX ADMIN — METRONIDAZOLE SCH MLS/HR: 500 INJECTION, SOLUTION INTRAVENOUS at 16:06

## 2018-06-09 RX ADMIN — Medication SCH MG: at 12:51

## 2018-06-09 RX ADMIN — DEXTROSE AND SODIUM CHLORIDE SCH MLS/HR: 5; 900 INJECTION, SOLUTION INTRAVENOUS at 02:02

## 2018-06-09 NOTE — CP.PCM.PN
Subjective





- Date & Time of Evaluation


Date of Evaluation: 06/09/18


Time of Evaluation: 10:45





- Subjective


Subjective: 


resting comfortably, NAD, 








Objective





- Vital Signs/Intake and Output


Vital Signs (last 24 hours): 


 











Temp Pulse Resp BP Pulse Ox


 


 98.5 F   98 H  14   105/49 L  96 


 


 06/08/18 20:00  06/09/18 06:00  06/09/18 05:20  06/09/18 05:00  06/09/18 05:20








Intake and Output: 


 











 06/09/18 06/09/18





 06:59 18:59


 


Intake Total 1750 


 


Output Total 102 


 


Balance 1648 














- Medications


Medications: 


 Current Medications





Albuterol/Ipratropium (Duoneb 3 Mg/0.5 Mg (3 Ml) Ud)  3 ml IH [id number] PRN


   PRN Reason: Shortness of Breath


   Last Admin: 06/09/18 07:26 Dose:  3 ml


Arformoterol Tartrate (Brovana)  15 mcg IH [id number] [facility]


Budesonide (Pulmicort Respules)  0.5 mg IH [id number] [facility]


   Last Admin: 06/09/18 07:26 Dose:  0.5 mg


Al Hydrox/Mg Hydrox/Simethicone 30 ml/Diphenhydramine HCl 75 mg/Lidocaine 30 ml

  0 ml PO Q2H PRN


   PRN Reason: Mouth/Throat Pain


   Last Admin: 06/07/18 04:04 Dose:  30 ml


Metronidazole (Flagyl)  500 mg in 100 mls @ 100 mls/hr IVPB Q8 [facility]


   PRN Reason: Protocol


   Last Admin: 06/09/18 05:45 Dose:  100 mls/hr


Dextrose/Sodium Chloride (Dextrose 5%/0.9% Ns 1000 Ml)  1,000 mls @ 100 mls/hr 

IV .Q10H [facility]


   Last Admin: 06/09/18 02:02 Dose:  100 mls/hr


Meropenem (Merrem Iv 1 Gm Premix)  50 mls @ 100 mls/hr IVPB Q8 [facility]


   PRN Reason: Protocol


   Stop: 06/14/18 06:31


   Last Admin: 06/09/18 05:46 Dose:  100 mls/hr


Potassium Phosphate 15 mmole/ (Sodium Chloride)  255 mls @ 42.5 mls/hr IVPB 

DAILY [facility]


Lidocaine HCl (Lidocaine 2% Viscous)  15 ml MM Q3H PRN


   PRN Reason: Other


   Last Admin: 06/08/18 09:06 Dose:  15 ml


Magnesium Oxide (Mag-Ox)  400 mg PO DAILY [facility]


Nystatin (Nystatin Oral Susp)  5 ml PO QID [facility]


   Last Admin: 06/09/18 09:21 Dose:  Not Given


Pantoprazole Sodium (Protonix Inj)  40 mg IVP DAILY [facility]


   Last Admin: 06/09/18 09:09 Dose:  40 mg


Vancomycin HCl (Vancocin 25 Mg/Ml (Oral Use))  125 mg PO QID [facility]


   PRN Reason: Protocol


   Last Admin: 06/09/18 10:13 Dose:  125 mg











- Labs


Labs: 


 





 06/09/18 05:30 





 06/09/18 05:30 





 











PT  24.8 SECONDS (9.4-12.5)  H  06/08/18  09:00    


 


INR  2.12  (0.93-1.08)  H  06/08/18  09:00    


 


APTT  30.5 Seconds (25.1-36.5)   06/08/18  09:00    














- Respiratory Exam


Respiratory Exam: Clear to Ausculation Bilateral, NORMAL BREATHING PATTERN





- Cardiovascular Exam


Cardiovascular Exam: REGULAR RHYTHM





- GI/Abdominal Exam


GI & Abdominal Exam: Soft, Normal Bowel Sounds





- Neurological Exam


Neurological Exam: Alert, Awake





- Skin


Skin Exam: Dry, Warm





Assessment and Plan


(1) Cholangiocarcinoma


Status: Acute   





(2) Neutropenic fever


Status: Acute   





(3) Abdominal pain


Status: Acute   





(4) Hypokalemia


Status: Acute   





- Assessment and Plan (Free Text)


Plan: 





continue present rx, IV Abx, potassium supplement, monitor lytes, on PO Vanco 

for Cdiff

## 2018-06-09 NOTE — CP.PCM.PN
Subjective





- Date & Time of Evaluation


Date of Evaluation: 06/09/18


Time of Evaluation: 10:30





- Subjective


Subjective: 





No fevers, not in distress, diarrhea is slowly improving, no abdominal pain. 

Still feels weak.





Objective





- Vital Signs/Intake and Output


Vital Signs (last 24 hours): 


 











Temp Pulse Resp BP Pulse Ox


 


 98.5 F   98 H  14   105/49 L  96 


 


 06/08/18 20:00  06/09/18 06:00  06/09/18 05:20  06/09/18 05:00  06/09/18 05:20








Intake and Output: 


 











 06/09/18 06/09/18





 06:59 18:59


 


Intake Total 1750 


 


Output Total 102 


 


Balance 1648 














- Medications


Medications: 


 Current Medications





Albuterol/Ipratropium (Duoneb 3 Mg/0.5 Mg (3 Ml) Ud)  3 ml IH [id number] PRN


   PRN Reason: Shortness of Breath


   Last Admin: 06/09/18 07:26 Dose:  3 ml


Arformoterol Tartrate (Brovana)  15 mcg IH [id number] [facility]


Budesonide (Pulmicort Respules)  0.5 mg IH [id number] [facility]


   Last Admin: 06/09/18 07:26 Dose:  0.5 mg


Al Hydrox/Mg Hydrox/Simethicone 30 ml/Diphenhydramine HCl 75 mg/Lidocaine 30 ml

  0 ml PO Q2H PRN


   PRN Reason: Mouth/Throat Pain


   Last Admin: 06/07/18 04:04 Dose:  30 ml


Metronidazole (Flagyl)  500 mg in 100 mls @ 100 mls/hr IVPB Q8 [facility]


   PRN Reason: Protocol


   Last Admin: 06/09/18 05:45 Dose:  100 mls/hr


Dextrose/Sodium Chloride (Dextrose 5%/0.9% Ns 1000 Ml)  1,000 mls @ 100 mls/hr 

IV .Q10H [facility]


   Last Admin: 06/09/18 02:02 Dose:  100 mls/hr


Meropenem (Merrem Iv 1 Gm Premix)  50 mls @ 100 mls/hr IVPB Q8 [facility]


   PRN Reason: Protocol


   Stop: 06/14/18 06:31


   Last Admin: 06/09/18 05:46 Dose:  100 mls/hr


Potassium Phosphate 15 mmole/ (Sodium Chloride)  255 mls @ 42.5 mls/hr IVPB 

DAILY [facility]


Lidocaine HCl (Lidocaine 2% Viscous)  15 ml MM Q3H PRN


   PRN Reason: Other


   Last Admin: 06/08/18 09:06 Dose:  15 ml


Magnesium Oxide (Mag-Ox)  400 mg PO DAILY [facility]


Nystatin (Nystatin Oral Susp)  5 ml PO QID [facility]


   Last Admin: 06/09/18 09:21 Dose:  Not Given


Pantoprazole Sodium (Protonix Inj)  40 mg IVP DAILY [facility]


   Last Admin: 06/09/18 09:09 Dose:  40 mg


Vancomycin HCl (Vancocin 25 Mg/Ml (Oral Use))  125 mg PO QID [facility]


   PRN Reason: Protocol


   Last Admin: 06/09/18 10:13 Dose:  125 mg











- Labs


Labs: 


 





 06/09/18 05:30 





 06/09/18 05:30 





 











PT  24.8 SECONDS (9.4-12.5)  H  06/08/18  09:00    


 


INR  2.12  (0.93-1.08)  H  06/08/18  09:00    


 


APTT  30.5 Seconds (25.1-36.5)   06/08/18  09:00    














- Constitutional


Appears: Cachectic, Chronically Ill





- Head Exam


Head Exam: NORMAL INSPECTION





- ENT Exam


ENT Exam: Mucous Membranes Moist





- Neck Exam


Neck Exam: absent: Meningismus





- Respiratory Exam


Respiratory Exam: Decreased Breath Sounds


Additional comments: 





right anterior chest wall port in place





- Cardiovascular Exam


Cardiovascular Exam: +S1, +S2





- GI/Abdominal Exam


GI & Abdominal Exam: Soft.  absent: Tenderness





Assessment and Plan





- Assessment and Plan (Free Text)


Plan: 





Assessment


Severe Sepsis with febrile neutropenia, due to pancolitis, with c. diff.


history of sepsis from biliary tree as the source in a patient with primary 

sclerosing cholangitis with liver cirrhosis S/P ERCP and biliary stent 

placement - S/P biopsy of the enlarged lymph nodes adjacent to the common bile 

duct


cholangiocarcinoma on chemotherapy


S/P cholecystectomy


Ulcerative colitis


history of MSSA sinusitis


COPD


allergic rhinitis


giant cell arteritis





Plan


will continue Merrem day 3 pending stool cx; stool for C. diff is positive and 

will continue PO Vancomycin and IV Flagyl day 3 to complete 10-14 days


will continue monitor clinically


overall prognosis is poor


discussed with Dr. Valencia previously

## 2018-06-10 LAB
ALBUMIN SERPL-MCNC: 1.8 G/DL (ref 3–4.8)
ALBUMIN/GLOB SERPL: 0.6 {RATIO} (ref 1.1–1.8)
ALT SERPL-CCNC: 50 U/L (ref 7–56)
ANISOCYTOSIS BLD QL SMEAR: SLIGHT
AST SERPL-CCNC: 88 U/L (ref 14–36)
BASOPHILS # BLD AUTO: 0.45 K/MM3 (ref 0–2)
BASOPHILS NFR BLD: 1.1 % (ref 0–3)
BUN SERPL-MCNC: 11 MG/DL (ref 7–21)
CALCIUM SERPL-MCNC: 8.2 MG/DL (ref 8.4–10.5)
DACRYOCYTES BLD QL SMEAR: SLIGHT
EOSINOPHIL # BLD: 0 10*3/UL (ref 0–0.7)
EOSINOPHIL NFR BLD: 0 % (ref 1.5–5)
ERYTHROCYTE [DISTWIDTH] IN BLOOD BY AUTOMATED COUNT: 20.5 % (ref 11.5–14.5)
GFR NON-AFRICAN AMERICAN: > 60
HGB BLD-MCNC: 8.6 G/DL (ref 12–16)
LYMPHOCYTE: 19 % (ref 22–35)
MCH RBC QN AUTO: 30.6 PG (ref 25–35)
MCHC RBC AUTO-ENTMCNC: 32.7 G/DL (ref 31–37)
MCV RBC AUTO: 93.6 FL (ref 80–105)
METAMYELOCYTES NFR BLD: 6 %
MONOCYTE: 2 % (ref 1–6)
MONOCYTES # BLD AUTO: 1.6 10*3/UL (ref 0.1–0.6)
MONOCYTES NFR BLD: 4 % (ref 1–6)
MYELOCYTES NFR BLD: 2 %
NEUTROPHILS NFR BLD AUTO: 68 % (ref 50–70)
NEUTS BAND NFR BLD: 3 % (ref 0–2)
NRBC BLD AUTO-RTO: 1 %
PLATELET # BLD: 118 10^3/UL (ref 120–450)
PMV BLD AUTO: 10.5 FL (ref 7–11)
RBC # BLD AUTO: 2.81 10^6/UL (ref 3.5–6.1)
WBC # BLD AUTO: 40.1 10^3/UL (ref 4.5–11)

## 2018-06-10 RX ADMIN — VANCOMYCIN HYDROCHLORIDE SCH MG: 500 INJECTION, POWDER, LYOPHILIZED, FOR SOLUTION INTRAVENOUS at 18:04

## 2018-06-10 RX ADMIN — VANCOMYCIN HYDROCHLORIDE SCH MG: 500 INJECTION, POWDER, LYOPHILIZED, FOR SOLUTION INTRAVENOUS at 23:00

## 2018-06-10 RX ADMIN — NYSTATIN SCH: 100000 SUSPENSION ORAL at 21:05

## 2018-06-10 RX ADMIN — Medication SCH MG: at 09:42

## 2018-06-10 RX ADMIN — METRONIDAZOLE SCH MLS/HR: 500 INJECTION, SOLUTION INTRAVENOUS at 05:35

## 2018-06-10 RX ADMIN — NYSTATIN SCH: 100000 SUSPENSION ORAL at 17:41

## 2018-06-10 RX ADMIN — METRONIDAZOLE SCH MLS/HR: 500 INJECTION, SOLUTION INTRAVENOUS at 14:24

## 2018-06-10 RX ADMIN — SODIUM CHLORIDE SCH MLS/HR: 0.9 INJECTION, SOLUTION INTRAVENOUS at 09:48

## 2018-06-10 RX ADMIN — METRONIDAZOLE SCH MLS/HR: 500 INJECTION, SOLUTION INTRAVENOUS at 21:28

## 2018-06-10 RX ADMIN — NYSTATIN SCH ML: 100000 SUSPENSION ORAL at 09:43

## 2018-06-10 RX ADMIN — DEXTROSE AND SODIUM CHLORIDE SCH MLS/HR: 5; 900 INJECTION, SOLUTION INTRAVENOUS at 05:32

## 2018-06-10 RX ADMIN — NYSTATIN SCH: 100000 SUSPENSION ORAL at 14:28

## 2018-06-10 RX ADMIN — BUDESONIDE SCH: 0.5 SUSPENSION RESPIRATORY (INHALATION) at 07:59

## 2018-06-10 RX ADMIN — VANCOMYCIN HYDROCHLORIDE SCH MG: 500 INJECTION, POWDER, LYOPHILIZED, FOR SOLUTION INTRAVENOUS at 14:28

## 2018-06-10 RX ADMIN — DEXTROSE AND SODIUM CHLORIDE SCH MLS/HR: 5; 900 INJECTION, SOLUTION INTRAVENOUS at 05:25

## 2018-06-10 RX ADMIN — BUDESONIDE SCH MG: 0.5 SUSPENSION RESPIRATORY (INHALATION) at 20:41

## 2018-06-10 RX ADMIN — ARFORMOTEROL TARTRATE SCH: 15 SOLUTION RESPIRATORY (INHALATION) at 08:01

## 2018-06-10 RX ADMIN — MEROPENEM SCH MLS/HR: 1 INJECTION INTRAVENOUS at 21:27

## 2018-06-10 RX ADMIN — VANCOMYCIN HYDROCHLORIDE SCH MG: 500 INJECTION, POWDER, LYOPHILIZED, FOR SOLUTION INTRAVENOUS at 09:49

## 2018-06-10 RX ADMIN — NYSTATIN SCH: 100000 SUSPENSION ORAL at 10:48

## 2018-06-10 RX ADMIN — MEROPENEM SCH MLS/HR: 1 INJECTION INTRAVENOUS at 14:25

## 2018-06-10 RX ADMIN — MEROPENEM SCH MLS/HR: 1 INJECTION INTRAVENOUS at 05:34

## 2018-06-10 NOTE — PN
DATE:  06/10/2018



SUBJECTIVE:  This patient was seen and evaluated earlier today.  The

patient's family was at bedside.  The patient had a solid bowel movements

now.  Abdomen still complains of some bloating, on clear liquid diet.



PHYSICAL EXAMINATION:

VITAL SIGNS:  Temperature is 97, pulse 104, blood pressure is 100/43.

HEENT:  Atraumatic, jaundiced.

NECK:  Supple.

HEART:  S1,  S2 heard.

LUNGS:  Reduced air entry at the bases.

ABDOMEN:  Softly distended.  Bowel sounds present.

EXTREMITIES:  Edema present.



LABORATORY DATA:  Hemoglobin 8.6, hematocrit 26.3, WBC 40.1, platelets 118.

Chemistry shows total bilirubin has come down to 3.9, alk phos is 254.  AST

80, ALT 50.  INR on 06/08 was 2.2.



IMPRESSION:  This 73-year-old patient with metastatic cholangiocarcinoma

status post chemo admitted with sepsis, pancolitis with the elevated LFTs. 

The patient is clinically appears to be improving.  The significant

thickening of the colon noticed especially in the right colon.  The

patient's diet yesterday has been changed to clear liquid diet.



Leukocytosis, WBC count is 40.1.  The patient was on Granix which was

discontinued today.  The patient was admitted with pancytopenia even the

platelet count is showing improvement probably secondary to the chemo.



Abnormal liver function tests.  The patient had cholangiocarcinoma with

stricture status post metal stent placement.  At this time the LFTs

significantly elevated ______ total bilirubin could be secondary to the

chemo-induced.  It is also showing a downward trend.  We will continue to

closely followup sepsis.  Continue the antibiotics as per ID.



Clostridium difficile colitis, the patient is being on _____ antigen

positive.  The patient is being on p.o. vancomycin.  We will continue that.

Other comorbidities included history of ulcerative colitis, history of

methicillin-sensitive Staphylococcus aureus, sinusitis, chronic obstructive

pulmonary disease.  We will slowly advance the diet based on the clinical

course.  We will check the INR in a.m. and repeat the follow up of the LFTs

and discussed with also the patient's daughter who was at bedside.



Thank you very much for allowing us to participate in the care of the

patient.





__________________________________________

Nick Valencia MD



DD:  06/10/2018 16:54:47

DT:  06/10/2018 20:53:08

Job # [id number]

## 2018-06-10 NOTE — CP.PCM.PN
Subjective





- Date & Time of Evaluation


Date of Evaluation: 06/10/18


Time of Evaluation: 11:15





- Subjective


Subjective: 





NAD, diarrhea improved, less oral discomfort





Objective





- Vital Signs/Intake and Output


Vital Signs (last 24 hours): 


 











Temp Pulse Resp BP Pulse Ox


 


 97.8 F   102 H  14   100/43 L  100 


 


 06/10/18 04:00  06/10/18 10:00  06/10/18 06:00  06/10/18 04:00  06/10/18 06:00








Intake and Output: 


 











 06/10/18 06/10/18





 06:59 18:59


 


Intake Total 1560 


 


Output Total 1200 


 


Balance 360 














- Medications


Medications: 


 Current Medications





Albuterol/Ipratropium (Duoneb 3 Mg/0.5 Mg (3 Ml) Ud)  3 ml IH [id number] PRN


   PRN Reason: Shortness of Breath


   Last Admin: 06/09/18 21:05 Dose:  3 ml


Arformoterol Tartrate (Brovana)  15 mcg IH [id number] [facility]


   Last Admin: 06/10/18 08:01 Dose:  Not Given


Budesonide (Pulmicort Respules)  0.5 mg IH [id number] [facility]


   Last Admin: 06/10/18 07:59 Dose:  Not Given


Al Hydrox/Mg Hydrox/Simethicone 30 ml/Diphenhydramine HCl 75 mg/Lidocaine 30 ml

  0 ml PO Q2H PRN


   PRN Reason: Mouth/Throat Pain


   Last Admin: 06/07/18 04:04 Dose:  30 ml


Metronidazole (Flagyl)  500 mg in 100 mls @ 100 mls/hr IVPB Q8 [facility]


   PRN Reason: Protocol


   Last Admin: 06/10/18 05:35 Dose:  100 mls/hr


Dextrose/Sodium Chloride (Dextrose 5%/0.9% Ns 1000 Ml)  1,000 mls @ 100 mls/hr 

IV .Q10H [facility]


   Last Admin: 06/10/18 05:32 Dose:  100 mls/hr


Meropenem (Merrem Iv 1 Gm Premix)  50 mls @ 100 mls/hr IVPB Q8 [facility]


   PRN Reason: Protocol


   Stop: 06/14/18 06:31


   Last Admin: 06/10/18 05:34 Dose:  100 mls/hr


Potassium Phosphate 15 mmole/ (Sodium Chloride)  255 mls @ 42.5 mls/hr IVPB 

DAILY [facility]


   Last Admin: 06/10/18 09:48 Dose:  42.5 mls/hr


Lidocaine HCl (Lidocaine 2% Viscous)  15 ml MM Q3H PRN


   PRN Reason: Other


   Last Admin: 06/08/18 09:06 Dose:  15 ml


Magnesium Oxide (Mag-Ox)  400 mg PO DAILY [facility]


   Last Admin: 06/10/18 09:42 Dose:  400 mg


Nystatin (Nystatin Oral Susp)  5 ml PO QID [facility]


   Last Admin: 06/10/18 10:48 Dose:  Not Given


Pantoprazole Sodium (Protonix Inj)  40 mg IVP DAILY [facility]


   Last Admin: 06/10/18 09:42 Dose:  40 mg


Vancomycin HCl (Vancocin 25 Mg/Ml (Oral Use))  125 mg PO QID [facility]


   PRN Reason: Protocol


   Last Admin: 06/10/18 09:49 Dose:  125 mg











- Labs


Labs: 


 





 06/10/18 05:30 





 06/10/18 06:30 





 











PT  24.8 SECONDS (9.4-12.5)  H  06/08/18  09:00    


 


INR  2.12  (0.93-1.08)  H  06/08/18  09:00    


 


APTT  30.5 Seconds (25.1-36.5)   06/08/18  09:00    














- Respiratory Exam


Respiratory Exam: Clear to Ausculation Bilateral, NORMAL BREATHING PATTERN





- Cardiovascular Exam


Cardiovascular Exam: REGULAR RHYTHM





- GI/Abdominal Exam


GI & Abdominal Exam: Soft, Normal Bowel Sounds





- Extremities Exam


Extremities Exam: Pedal Edema





- Neurological Exam


Neurological Exam: Alert, Awake





- Skin


Skin Exam: Dry, Warm





Assessment and Plan


(1) Cholangiocarcinoma


Status: Acute   





(2) Neutropenic fever


Status: Acute   





(3) Abdominal pain


Status: Acute   





(4) Hypokalemia


Status: Acute   





(5) Clostridium difficile colitis


Status: Acute   





- Assessment and Plan (Free Text)


Plan: 


continue IV Abx, d/c neupogen due to leukocytosis, vanco PO, GI follow-up on 

liquid diet at present

## 2018-06-10 NOTE — CP.PCM.PN
Subjective





- Date & Time of Evaluation


Date of Evaluation: 06/10/18


Time of Evaluation: 09:05





- Subjective


Subjective: 





No fevers, not in distress, no diarrhea currently but still with abdominal 

discomfort.





Objective





- Vital Signs/Intake and Output


Vital Signs (last 24 hours): 


 











Temp Pulse Resp BP Pulse Ox


 


 97.8 F   104 H  14   100/43 L  100 


 


 06/10/18 04:00  06/10/18 06:00  06/10/18 06:00  06/10/18 04:00  06/10/18 06:00











- Medications


Medications: 


 Current Medications





Albuterol/Ipratropium (Duoneb 3 Mg/0.5 Mg (3 Ml) Ud)  3 ml IH [id number] PRN


   PRN Reason: Shortness of Breath


   Last Admin: 06/09/18 21:05 Dose:  3 ml


Arformoterol Tartrate (Brovana)  15 mcg IH [id number] [facility]


Budesonide (Pulmicort Respules)  0.5 mg IH [id number] [facility]


   Last Admin: 06/09/18 21:05 Dose:  0.5 mg


Al Hydrox/Mg Hydrox/Simethicone 30 ml/Diphenhydramine HCl 75 mg/Lidocaine 30 ml

  0 ml PO Q2H PRN


   PRN Reason: Mouth/Throat Pain


   Last Admin: 06/07/18 04:04 Dose:  30 ml


Metronidazole (Flagyl)  500 mg in 100 mls @ 100 mls/hr IVPB Q8 [facility]


   PRN Reason: Protocol


   Last Admin: 06/10/18 05:35 Dose:  100 mls/hr


Dextrose/Sodium Chloride (Dextrose 5%/0.9% Ns 1000 Ml)  1,000 mls @ 100 mls/hr 

IV .Q10H [facility]


   Last Admin: 06/10/18 05:32 Dose:  100 mls/hr


Meropenem (Merrem Iv 1 Gm Premix)  50 mls @ 100 mls/hr IVPB Q8 [facility]


   PRN Reason: Protocol


   Stop: 06/14/18 06:31


   Last Admin: 06/10/18 05:34 Dose:  100 mls/hr


Potassium Phosphate 15 mmole/ (Sodium Chloride)  255 mls @ 42.5 mls/hr IVPB 

DAILY [facility]


Lidocaine HCl (Lidocaine 2% Viscous)  15 ml MM Q3H PRN


   PRN Reason: Other


   Last Admin: 06/08/18 09:06 Dose:  15 ml


Magnesium Oxide (Mag-Ox)  400 mg PO DAILY [facility]


   Last Admin: 06/09/18 12:51 Dose:  400 mg


Nystatin (Nystatin Oral Susp)  5 ml PO QID [facility]


   Last Admin: 06/09/18 22:38 Dose:  5 ml


Pantoprazole Sodium (Protonix Inj)  40 mg IVP DAILY [facility]


   Last Admin: 06/09/18 09:09 Dose:  40 mg


Vancomycin HCl (Vancocin 25 Mg/Ml (Oral Use))  125 mg PO QID [facility]


   PRN Reason: Protocol


   Last Admin: 06/09/18 22:47 Dose:  125 mg











- Labs


Labs: 


 





 06/10/18 05:30 





 06/09/18 05:30 





 











PT  24.8 SECONDS (9.4-12.5)  H  06/08/18  09:00    


 


INR  2.12  (0.93-1.08)  H  06/08/18  09:00    


 


APTT  30.5 Seconds (25.1-36.5)   06/08/18  09:00    














- Constitutional


Appears: Cachectic, Chronically Ill





- Head Exam


Head Exam: NORMAL INSPECTION





- ENT Exam


ENT Exam: Mucous Membranes Moist





- Neck Exam


Neck Exam: absent: Meningismus





- Respiratory Exam


Respiratory Exam: Decreased Breath Sounds


Additional comments: 





right anterior chest wall port in place





- Cardiovascular Exam


Cardiovascular Exam: +S1, +S2





- GI/Abdominal Exam


GI & Abdominal Exam: Soft.  absent: Tenderness





Assessment and Plan





- Assessment and Plan (Free Text)


Plan: 





Assessment


Severe Sepsis with febrile neutropenia, due to pancolitis, with c. diff.


history of sepsis from biliary tree as the source in a patient with primary 

sclerosing cholangitis with liver cirrhosis S/P ERCP and biliary stent 

placement - S/P biopsy of the enlarged lymph nodes adjacent to the common bile 

duct


cholangiocarcinoma on chemotherapy


S/P cholecystectomy


Ulcerative colitis


history of MSSA sinusitis


COPD


allergic rhinitis


giant cell arteritis





Plan


will continue Merrem day 4 pending stool cx; stool for C. diff is positive and 

will continue PO Vancomycin and IV Flagyl day 4 to complete 10-14 days


will continue monitor clinically


overall prognosis is poor


discussed with Dr. Valencia previously

## 2018-06-11 LAB
ALBUMIN SERPL-MCNC: 1.9 G/DL (ref 3–4.8)
ALBUMIN/GLOB SERPL: 0.6 {RATIO} (ref 1.1–1.8)
ALT SERPL-CCNC: 53 U/L (ref 7–56)
AST SERPL-CCNC: 99 U/L (ref 14–36)
BUN SERPL-MCNC: 10 MG/DL (ref 7–21)
CALCIUM SERPL-MCNC: 8.2 MG/DL (ref 8.4–10.5)
ERYTHROCYTE [DISTWIDTH] IN BLOOD BY AUTOMATED COUNT: 21.3 % (ref 11.5–14.5)
GFR NON-AFRICAN AMERICAN: > 60
HGB BLD-MCNC: 8.9 G/DL (ref 12–16)
INR PPP: 1.85 (ref 0.93–1.08)
LYMPHOCYTE: 1 % (ref 22–35)
MCH RBC QN AUTO: 30.5 PG (ref 25–35)
MCHC RBC AUTO-ENTMCNC: 32.1 G/DL (ref 31–37)
MCV RBC AUTO: 94.9 FL (ref 80–105)
MONOCYTE: 3 % (ref 1–6)
NEUTROPHILS NFR BLD AUTO: 80 % (ref 50–70)
NEUTS BAND NFR BLD: 16 % (ref 0–2)
PLATELET # BLD: 145 10^3/UL (ref 120–450)
PMV BLD AUTO: 11.1 FL (ref 7–11)
PROTHROMBIN TIME: 21.5 SECONDS (ref 9.4–12.5)
RBC # BLD AUTO: 2.92 10^6/UL (ref 3.5–6.1)
WBC # BLD AUTO: 50.6 10^3/UL (ref 4.5–11)

## 2018-06-11 RX ADMIN — NYSTATIN SCH ML: 100000 SUSPENSION ORAL at 21:54

## 2018-06-11 RX ADMIN — VANCOMYCIN HYDROCHLORIDE SCH MG: 500 INJECTION, POWDER, LYOPHILIZED, FOR SOLUTION INTRAVENOUS at 09:15

## 2018-06-11 RX ADMIN — NYSTATIN SCH ML: 100000 SUSPENSION ORAL at 09:11

## 2018-06-11 RX ADMIN — VANCOMYCIN HYDROCHLORIDE SCH MG: 500 INJECTION, POWDER, LYOPHILIZED, FOR SOLUTION INTRAVENOUS at 14:38

## 2018-06-11 RX ADMIN — Medication SCH MG: at 09:11

## 2018-06-11 RX ADMIN — METRONIDAZOLE SCH MLS/HR: 500 INJECTION, SOLUTION INTRAVENOUS at 14:42

## 2018-06-11 RX ADMIN — BUDESONIDE SCH MG: 0.5 SUSPENSION RESPIRATORY (INHALATION) at 22:40

## 2018-06-11 RX ADMIN — SODIUM CHLORIDE SCH MLS/HR: 0.9 INJECTION, SOLUTION INTRAVENOUS at 13:11

## 2018-06-11 RX ADMIN — NYSTATIN SCH: 100000 SUSPENSION ORAL at 14:46

## 2018-06-11 RX ADMIN — METRONIDAZOLE SCH MLS/HR: 500 INJECTION, SOLUTION INTRAVENOUS at 05:40

## 2018-06-11 RX ADMIN — MEROPENEM SCH MLS/HR: 1 INJECTION INTRAVENOUS at 05:42

## 2018-06-11 RX ADMIN — BUDESONIDE SCH: 0.5 SUSPENSION RESPIRATORY (INHALATION) at 07:28

## 2018-06-11 RX ADMIN — VANCOMYCIN HYDROCHLORIDE SCH MG: 500 INJECTION, POWDER, LYOPHILIZED, FOR SOLUTION INTRAVENOUS at 22:10

## 2018-06-11 RX ADMIN — DEXTROSE AND SODIUM CHLORIDE SCH MLS/HR: 5; 900 INJECTION, SOLUTION INTRAVENOUS at 19:06

## 2018-06-11 RX ADMIN — NYSTATIN SCH ML: 100000 SUSPENSION ORAL at 18:46

## 2018-06-11 RX ADMIN — METRONIDAZOLE SCH MLS/HR: 500 INJECTION, SOLUTION INTRAVENOUS at 21:54

## 2018-06-11 RX ADMIN — VANCOMYCIN HYDROCHLORIDE SCH MG: 500 INJECTION, POWDER, LYOPHILIZED, FOR SOLUTION INTRAVENOUS at 18:47

## 2018-06-11 NOTE — CP.PCM.PN
Subjective





- Date & Time of Evaluation


Date of Evaluation: 06/11/18


Time of Evaluation: 09:40





- Subjective


Subjective: 





NAD, no abd pain or diarrhea





Objective





- Vital Signs/Intake and Output


Vital Signs (last 24 hours): 


 











Temp Pulse Resp BP Pulse Ox


 


 97.2 F L  94 H  14   104/60   100 


 


 06/11/18 04:00  06/11/18 06:00  06/11/18 06:00  06/11/18 04:00  06/11/18 06:00








Intake and Output: 


 











 06/11/18 06/11/18





 06:59 18:59


 


Intake Total 1480 


 


Output Total 400 


 


Balance 1080 














- Medications


Medications: 


 Current Medications





Albuterol/Ipratropium (Duoneb 3 Mg/0.5 Mg (3 Ml) Ud)  3 ml IH [id number] PRN


   PRN Reason: Shortness of Breath


   Last Admin: 06/09/18 21:05 Dose:  3 ml


Arformoterol Tartrate (Brovana)  15 mcg IH [id number] [facility]


   Last Admin: 06/10/18 08:01 Dose:  Not Given


Budesonide (Pulmicort Respules)  0.5 mg IH [id number] [facility]


   Last Admin: 06/11/18 07:28 Dose:  Not Given


Al Hydrox/Mg Hydrox/Simethicone 30 ml/Diphenhydramine HCl 75 mg/Lidocaine 30 ml

  0 ml PO Q2H PRN


   PRN Reason: Mouth/Throat Pain


   Last Admin: 06/07/18 04:04 Dose:  30 ml


Metronidazole (Flagyl)  500 mg in 100 mls @ 100 mls/hr IVPB Q8 [facility]


   PRN Reason: Protocol


   Last Admin: 06/11/18 05:40 Dose:  100 mls/hr


Dextrose/Sodium Chloride (Dextrose 5%/0.9% Ns 1000 Ml)  1,000 mls @ 100 mls/hr 

IV .Q10H [facility]


   Last Admin: 06/10/18 05:32 Dose:  100 mls/hr


Meropenem (Merrem Iv 1 Gm Premix)  50 mls @ 100 mls/hr IVPB Q8 [facility]


   PRN Reason: Protocol


   Stop: 06/14/18 06:31


   Last Admin: 06/11/18 05:42 Dose:  100 mls/hr


Potassium Phosphate 15 mmole/ (Sodium Chloride)  255 mls @ 42.5 mls/hr IVPB 

DAILY [facility]


   Last Admin: 06/10/18 09:48 Dose:  42.5 mls/hr


Lidocaine HCl (Lidocaine 2% Viscous)  15 ml MM Q3H PRN


   PRN Reason: Other


   Last Admin: 06/08/18 09:06 Dose:  15 ml


Magnesium Oxide (Mag-Ox)  400 mg PO DAILY [facility]


   Last Admin: 06/11/18 09:11 Dose:  400 mg


Nystatin (Nystatin Oral Susp)  5 ml PO QID [facility]


   Last Admin: 06/11/18 09:11 Dose:  5 ml


Pantoprazole Sodium (Protonix Inj)  40 mg IVP DAILY [facility]


   Last Admin: 06/11/18 09:12 Dose:  40 mg


Ursodiol (Actigall)  300 mg PO BID [facility]


   Last Admin: 06/11/18 09:09 Dose:  300 mg


Vancomycin HCl (Vancocin 25 Mg/Ml (Oral Use))  125 mg PO QID [facility]


   PRN Reason: Protocol


   Last Admin: 06/11/18 09:15 Dose:  125 mg











- Labs


Labs: 


 





 06/11/18 05:20 





 06/11/18 05:20 





 











PT  21.5 SECONDS (9.4-12.5)  H  06/11/18  05:20    


 


INR  1.85  (0.93-1.08)  H  06/11/18  05:20    


 


APTT  30.5 Seconds (25.1-36.5)   06/08/18  09:00    














- Respiratory Exam


Respiratory Exam: Clear to Ausculation Bilateral, NORMAL BREATHING PATTERN





- Cardiovascular Exam


Cardiovascular Exam: REGULAR RHYTHM





- GI/Abdominal Exam


GI & Abdominal Exam: Soft, Normal Bowel Sounds





- Extremities Exam


Extremities Exam: Normal Inspection





- Neurological Exam


Neurological Exam: Alert, Awake





- Skin


Skin Exam: Dry, Warm





Assessment and Plan


(1) Cholangiocarcinoma


Status: Acute   





(2) Neutropenic fever


Status: Acute   





(3) Abdominal pain


Status: Acute   





(4) Hypokalemia


Status: Acute   





(5) Clostridium difficile colitis


Status: Acute   





- Assessment and Plan (Free Text)


Plan: 





continue present care, may transfer to med/surg, monitor WBC,s

## 2018-06-11 NOTE — CP.PCM.PN
<Margo Gomez - Last Filed: 06/11/18 11:06>





Subjective





- Date & Time of Evaluation


Date of Evaluation: 06/11/18


Time of Evaluation: 07:00





- Subjective


Subjective: 


GI Progress Note for GENARO Juarez PGY2





Patient seen and examined at bedside. There were no acute overnight events as 

per nursing staff. Patient reports feeling bloated and still has RLQ abdominal 

pain. She denies having any diarrhea, nausea/vomiting, chest pain, shortness of 

breath, fever or chills, dysuria or hematuria. 





Objective





- Vital Signs/Intake and Output


Vital Signs (last 24 hours): 


 











Temp Pulse Resp BP Pulse Ox


 


 97.2 F L  94 H  14   104/60   100 


 


 06/11/18 04:00  06/11/18 06:00  06/11/18 06:00  06/11/18 04:00  06/11/18 06:00








Intake and Output: 


 











 06/10/18 06/11/18





 18:59 06:59


 


Intake Total 1330 1480


 


Output Total 520 400


 


Balance 810 1080














- Medications


Medications: 


 Current Medications





Albuterol/Ipratropium (Duoneb 3 Mg/0.5 Mg (3 Ml) Ud)  3 ml IH [id number] PRN


   PRN Reason: Shortness of Breath


   Last Admin: 06/09/18 21:05 Dose:  3 ml


Arformoterol Tartrate (Brovana)  15 mcg IH [id number] [facility]


   Last Admin: 06/10/18 08:01 Dose:  Not Given


Budesonide (Pulmicort Respules)  0.5 mg IH [id number] [facility]


   Last Admin: 06/10/18 20:41 Dose:  0.5 mg


Al Hydrox/Mg Hydrox/Simethicone 30 ml/Diphenhydramine HCl 75 mg/Lidocaine 30 ml

  0 ml PO Q2H PRN


   PRN Reason: Mouth/Throat Pain


   Last Admin: 06/07/18 04:04 Dose:  30 ml


Metronidazole (Flagyl)  500 mg in 100 mls @ 100 mls/hr IVPB Q8 [facility]


   PRN Reason: Protocol


   Last Admin: 06/11/18 05:40 Dose:  100 mls/hr


Dextrose/Sodium Chloride (Dextrose 5%/0.9% Ns 1000 Ml)  1,000 mls @ 100 mls/hr 

IV .Q10H [facility]


   Last Admin: 06/10/18 05:32 Dose:  100 mls/hr


Meropenem (Merrem Iv 1 Gm Premix)  50 mls @ 100 mls/hr IVPB Q8 [facility]


   PRN Reason: Protocol


   Stop: 06/14/18 06:31


   Last Admin: 06/11/18 05:42 Dose:  100 mls/hr


Potassium Phosphate 15 mmole/ (Sodium Chloride)  255 mls @ 42.5 mls/hr IVPB 

DAILY [facility]


   Last Admin: 06/10/18 09:48 Dose:  42.5 mls/hr


Lidocaine HCl (Lidocaine 2% Viscous)  15 ml MM Q3H PRN


   PRN Reason: Other


   Last Admin: 06/08/18 09:06 Dose:  15 ml


Magnesium Oxide (Mag-Ox)  400 mg PO DAILY [facility]


   Last Admin: 06/10/18 09:42 Dose:  400 mg


Nystatin (Nystatin Oral Susp)  5 ml PO QID [facility]


   Last Admin: 06/10/18 21:05 Dose:  Not Given


Pantoprazole Sodium (Protonix Inj)  40 mg IVP DAILY [facility]


   Last Admin: 06/10/18 09:42 Dose:  40 mg


Ursodiol (Actigall)  300 mg PO BID [facility]


   Last Admin: 06/10/18 18:07 Dose:  300 mg


Vancomycin HCl (Vancocin 25 Mg/Ml (Oral Use))  125 mg PO QID [facility]


   PRN Reason: Protocol


   Last Admin: 06/10/18 23:00 Dose:  125 mg











- Labs


Labs: 


 





 06/11/18 05:20 





 06/10/18 06:30 





 











PT  21.5 SECONDS (9.4-12.5)  H  06/11/18  05:20    


 


INR  1.85  (0.93-1.08)  H  06/11/18  05:20    


 


APTT  30.5 Seconds (25.1-36.5)   06/08/18  09:00    














- Constitutional


Appears: Chronically Ill





- Head Exam


Head Exam: ATRAUMATIC, NORMAL INSPECTION, NORMOCEPHALIC





- Eye Exam


Eye Exam: Scleral icterus





- ENT Exam


ENT Exam: Mucous Membranes Moist





- Respiratory Exam


Respiratory Exam: Clear to Ausculation Bilateral, NORMAL BREATHING PATTERN.  

absent: Rales, Rhonchi, Wheezes





- Cardiovascular Exam


Cardiovascular Exam: Tachycardia, REGULAR RHYTHM, +S1, +S2.  absent: Gallop, 

Rubs, Murmur





- GI/Abdominal Exam


GI & Abdominal Exam: Soft, Tenderness (RLQ, RUQ, epigastic ), Normal Bowel 

Sounds.  absent: Rigid, Rebound





- Extremities Exam


Extremities Exam: Normal Inspection.  absent: Calf Tenderness, Pedal Edema





- Neurological Exam


Neurological Exam: Alert, Awake, CN II-XII Intact





- Psychiatric Exam


Psychiatric exam: Normal Affect, Normal Mood





- Skin


Skin Exam: Dry, Warm





Assessment and Plan





- Assessment and Plan (Free Text)


Assessment: 


This is a 72yo female with past medical history COPD, giant cell arteritis, 

Ulcerative colitis, cholangiocarcinoma s/p cholecystectomy and chemotherapy 2 

weeks who is admitted 





1. C.diff positive (no diarrhea) 


2. Sepsis with neutropenic fever secondary to pancolitis 


3. Oral thrush


4. Pancytopenia (improved)


5. cholangiocarcinoma on chemotherapy 


6. Primary sclerosing cholangitis 


7. Cirrhosis s/p ERCP with biliary stent 


8. Ulcerative colitis


9. COPD


10. Giant cell arteritis  





Plan: 


WBC increased, Granix was d/c. Continue IV antibiotics as well as oral Vanco 

and Nystatin. Patient is on contact precautions. Neutropenia resolved. ID is on 

consult. INR is trending down and LFTs are trending down as well. Will continue 

to monitor. Oncology recommendations appreciated. Continue liquid diet. Will 

slowly advance diet as tolerated. Patient is DNR/DNI. Palliative care is 

following. Prognosis is guarded. 





Case seen, discussed and reviewed with Dr. Valencia. 


GENARO Gomez PGY2





<Nick Valencia - Last Filed: 06/12/18 01:24>





Objective





- Vital Signs/Intake and Output


Vital Signs (last 24 hours): 


 











Temp Pulse Resp BP Pulse Ox


 


 98.4 F   101 H  20   118/72   99 


 


 06/12/18 00:37  06/12/18 00:37  06/12/18 00:37  06/12/18 00:37  06/11/18 23:11








Intake and Output: 


 











 06/11/18 06/12/18





 18:59 06:59


 


Intake Total  0


 


Balance  0














- Medications


Medications: 


 Current Medications





Albuterol/Ipratropium (Duoneb 3 Mg/0.5 Mg (3 Ml) Ud)  3 ml IH [id number] PRN


   PRN Reason: Shortness of Breath


   Last Admin: 06/09/18 21:05 Dose:  3 ml


Arformoterol Tartrate (Brovana)  15 mcg IH [id number] [facility]


   Last Admin: 06/10/18 08:01 Dose:  Not Given


Budesonide (Pulmicort Respules)  0.5 mg IH [id number] [facility]


   Last Admin: 06/11/18 22:40 Dose:  0.5 mg


Al Hydrox/Mg Hydrox/Simethicone 30 ml/Diphenhydramine HCl 75 mg/Lidocaine 30 ml

  0 ml PO Q2H PRN


   PRN Reason: Mouth/Throat Pain


   Last Admin: 06/07/18 04:04 Dose:  30 ml


Metronidazole (Flagyl)  500 mg in 100 mls @ 100 mls/hr IVPB Q8 [facility]


   PRN Reason: Protocol


   Last Admin: 06/11/18 21:54 Dose:  100 mls/hr


Dextrose/Sodium Chloride (Dextrose 5%/0.9% Ns 1000 Ml)  1,000 mls @ 100 mls/hr 

IV .Q10H [facility]


   Last Admin: 06/11/18 19:06 Dose:  100 mls/hr


Potassium Phosphate 15 mmole/ (Sodium Chloride)  255 mls @ 42.5 mls/hr IVPB 

DAILY [facility]


   Last Admin: 06/11/18 13:11 Dose:  42.5 mls/hr


Lidocaine HCl (Lidocaine 2% Viscous)  15 ml MM Q3H PRN


   PRN Reason: Other


   Last Admin: 06/08/18 09:06 Dose:  15 ml


Magnesium Oxide (Mag-Ox)  400 mg PO DAILY [facility]


   Last Admin: 06/11/18 09:11 Dose:  400 mg


Nystatin (Nystatin Oral Susp)  5 ml PO QID [facility]


   Last Admin: 06/11/18 21:54 Dose:  5 ml


Pantoprazole Sodium (Protonix Inj)  40 mg IVP DAILY [facility]


   Last Admin: 06/11/18 09:12 Dose:  40 mg


Ursodiol (Actigall)  300 mg PO BID [facility]


   Last Admin: 06/11/18 18:47 Dose:  300 mg


Vancomycin HCl (Vancocin 25 Mg/Ml (Oral Use))  500 mg PO QID [facility]


   PRN Reason: Protocol


   Last Admin: 06/11/18 22:10 Dose:  500 mg











- Labs


Labs: 


 





 06/11/18 05:20 





 06/11/18 05:20 





 











PT  21.5 SECONDS (9.4-12.5)  H  06/11/18  05:20    


 


INR  1.85  (0.93-1.08)  H  06/11/18  05:20    


 


APTT  30.5 Seconds (25.1-36.5)   06/08/18  09:00    














Attending/Attestation





- Attestation


I have personally seen and examined this patient.: Yes


I have fully participated in the care of the patient.: Yes


I have reviewed all pertinent clinical information, including history, physical 

exam and plan: Yes


Notes (Text): 


This is an addendum to GI progress  report dictated by the Medical Resident.The 

patient was seen and examined earlier.  Medical records, lab studies, imagings 

were reviewed.  Last 24 hours events reviewed.  Agreed with the above treatment 

plan as outlined in Medical Resident 's notes the with the addition of the 

following 


WBC count has increased to 50,000


of Granix only yesterday


Clinically patient has anasarca


Abdomen distended softly


We'll continue clear liquid diet today


Follow-up the left E


Continue by mouth Vanco for C. difficile


Continue antibiotics as per ID


06/12/18 01:22

## 2018-06-11 NOTE — PN
DATE:  06/09/2018



SUBJECTIVE:  This patient is progressively improving.  _____ complaining of

some abdominal bloating.  She had a soft diet in the a.m.



PHYSICAL EXAMINATION:

VITAL SIGNS:  Stable.

HEART:  S1 and S2 heard.

LUNGS:  Bilateral air entry present.

ABDOMEN:  Softly distended.

EXTREMITIES:  No edema present.



LABORATORY DATA:  Reviewed.  _____ pancytopenia improving.  Total bilirubin

still remains elevated.



IMPRESSION:

1.  Pancytopenia, probably secondary to chemotherapy.

2.  Sepsis.

3.  Colitis, multifactorial; history of Clostridium difficile _____

probably secondary to Clostridium difficile colitis, mucositis, and history

of ulcerative colitis.

4.  Metastatic cholangiocarcinoma.



RECOMMENDATIONS:

1.  We will discuss with Dr. Milian and change the diet to clear liquid

diet.

2.  Continue the antibiotics as per ID.

3.  Continue p.o. vancomycin for C. difficile _____.

4.  Follow up of LFTs, status post biliary metal stent placement.

5.  Patient has elevated LFTs probably secondary to the sepsis, drug

induced or chemo induced and also probably from cholelithiasis.  We will

continue to closely follow up her care and suggest further management based

on the clinical course.





__________________________________________

Nick Valencia MD



DD:  06/10/2018 1:10:40

DT:  06/10/2018 6:32:03

Job # [id number]

## 2018-06-11 NOTE — CP.PCM.PN
Subjective





- Date & Time of Evaluation


Date of Evaluation: 06/11/18


Time of Evaluation: 09:30





- Subjective


Subjective: 





Patient still feels weak and tired, no fevers, still with diarrhea.





Objective





- Vital Signs/Intake and Output


Vital Signs (last 24 hours): 


 











Temp Pulse Resp BP Pulse Ox


 


 97.2 F L  94 H  14   104/60   100 


 


 06/11/18 04:00  06/11/18 06:00  06/11/18 06:00  06/11/18 04:00  06/11/18 06:00








Intake and Output: 


 











 06/11/18 06/11/18





 06:59 18:59


 


Intake Total 1480 


 


Output Total 400 


 


Balance 1080 














- Medications


Medications: 


 Current Medications





Albuterol/Ipratropium (Duoneb 3 Mg/0.5 Mg (3 Ml) Ud)  3 ml IH [id number] PRN


   PRN Reason: Shortness of Breath


   Last Admin: 06/09/18 21:05 Dose:  3 ml


Arformoterol Tartrate (Brovana)  15 mcg IH [id number] [facility]


   Last Admin: 06/10/18 08:01 Dose:  Not Given


Budesonide (Pulmicort Respules)  0.5 mg IH [id number] [facility]


   Last Admin: 06/11/18 07:28 Dose:  Not Given


Al Hydrox/Mg Hydrox/Simethicone 30 ml/Diphenhydramine HCl 75 mg/Lidocaine 30 ml

  0 ml PO Q2H PRN


   PRN Reason: Mouth/Throat Pain


   Last Admin: 06/07/18 04:04 Dose:  30 ml


Metronidazole (Flagyl)  500 mg in 100 mls @ 100 mls/hr IVPB Q8 [facility]


   PRN Reason: Protocol


   Last Admin: 06/11/18 05:40 Dose:  100 mls/hr


Dextrose/Sodium Chloride (Dextrose 5%/0.9% Ns 1000 Ml)  1,000 mls @ 100 mls/hr 

IV .Q10H [facility]


   Last Admin: 06/10/18 05:32 Dose:  100 mls/hr


Meropenem (Merrem Iv 1 Gm Premix)  50 mls @ 100 mls/hr IVPB Q8 [facility]


   PRN Reason: Protocol


   Stop: 06/14/18 06:31


   Last Admin: 06/11/18 05:42 Dose:  100 mls/hr


Potassium Phosphate 15 mmole/ (Sodium Chloride)  255 mls @ 42.5 mls/hr IVPB 

DAILY [facility]


   Last Admin: 06/10/18 09:48 Dose:  42.5 mls/hr


Lidocaine HCl (Lidocaine 2% Viscous)  15 ml MM Q3H PRN


   PRN Reason: Other


   Last Admin: 06/08/18 09:06 Dose:  15 ml


Magnesium Oxide (Mag-Ox)  400 mg PO DAILY [facility]


   Last Admin: 06/11/18 09:11 Dose:  400 mg


Nystatin (Nystatin Oral Susp)  5 ml PO QID [facility]


   Last Admin: 06/11/18 09:11 Dose:  5 ml


Pantoprazole Sodium (Protonix Inj)  40 mg IVP DAILY [facility]


   Last Admin: 06/11/18 09:12 Dose:  40 mg


Ursodiol (Actigall)  300 mg PO BID [facility]


   Last Admin: 06/11/18 09:09 Dose:  300 mg


Vancomycin HCl (Vancocin 25 Mg/Ml (Oral Use))  125 mg PO QID [facility]


   PRN Reason: Protocol


   Last Admin: 06/11/18 09:15 Dose:  125 mg











- Labs


Labs: 


 





 06/11/18 05:20 





 06/11/18 05:20 





 











PT  21.5 SECONDS (9.4-12.5)  H  06/11/18  05:20    


 


INR  1.85  (0.93-1.08)  H  06/11/18  05:20    


 


APTT  30.5 Seconds (25.1-36.5)   06/08/18  09:00    














- Constitutional


Appears: Cachectic, Chronically Ill





- Head Exam


Head Exam: NORMAL INSPECTION





- ENT Exam


ENT Exam: Mucous Membranes Moist





- Neck Exam


Neck Exam: absent: Meningismus





- Respiratory Exam


Respiratory Exam: Decreased Breath Sounds


Additional comments: 





right anterior chest wall port in place





- Cardiovascular Exam


Cardiovascular Exam: +S1, +S2





- GI/Abdominal Exam


GI & Abdominal Exam: Soft.  absent: Tenderness





Assessment and Plan





- Assessment and Plan (Free Text)


Plan: 





Assessment


Severe Sepsis with febrile neutropenia, due to pancolitis, with c. diff.


history of sepsis from biliary tree as the source in a patient with primary 

sclerosing cholangitis with liver cirrhosis S/P ERCP and biliary stent 

placement - S/P biopsy of the enlarged lymph nodes adjacent to the common bile 

duct


cholangiocarcinoma on chemotherapy


S/P cholecystectomy


Ulcerative colitis


history of MSSA sinusitis


COPD


allergic rhinitis


giant cell arteritis





Plan


on Merrem day 5 - blood cx are negative - will d/c especially since she 

continues to have diarrhea; stool for C. diff is positive and will continue PO 

Vancomycin and IV Flagyl day 5 to complete 10-14 days (will increase dose of PO 

Vancomycin)


will continue monitor clinically


overall prognosis is poor


discussed with Dr. Valencia previously

## 2018-06-12 PROCEDURE — 0T9B70Z DRAINAGE OF BLADDER WITH DRAINAGE DEVICE, VIA NATURAL OR ARTIFICIAL OPENING: ICD-10-PCS | Performed by: INTERNAL MEDICINE

## 2018-06-12 RX ADMIN — METRONIDAZOLE SCH MLS/HR: 500 INJECTION, SOLUTION INTRAVENOUS at 13:59

## 2018-06-12 RX ADMIN — BUDESONIDE SCH: 0.5 SUSPENSION RESPIRATORY (INHALATION) at 07:44

## 2018-06-12 RX ADMIN — BUDESONIDE SCH MG: 0.5 SUSPENSION RESPIRATORY (INHALATION) at 09:47

## 2018-06-12 RX ADMIN — Medication SCH MG: at 09:39

## 2018-06-12 RX ADMIN — VANCOMYCIN HYDROCHLORIDE SCH MG: 500 INJECTION, POWDER, LYOPHILIZED, FOR SOLUTION INTRAVENOUS at 18:26

## 2018-06-12 RX ADMIN — IPRATROPIUM BROMIDE AND ALBUTEROL SULFATE PRN ML: .5; 3 SOLUTION RESPIRATORY (INHALATION) at 09:47

## 2018-06-12 RX ADMIN — BUDESONIDE SCH: 0.5 SUSPENSION RESPIRATORY (INHALATION) at 20:55

## 2018-06-12 RX ADMIN — METRONIDAZOLE SCH MLS/HR: 500 INJECTION, SOLUTION INTRAVENOUS at 05:43

## 2018-06-12 RX ADMIN — NYSTATIN SCH ML: 100000 SUSPENSION ORAL at 09:39

## 2018-06-12 RX ADMIN — METRONIDAZOLE SCH MLS/HR: 500 INJECTION, SOLUTION INTRAVENOUS at 21:42

## 2018-06-12 RX ADMIN — NYSTATIN SCH ML: 100000 SUSPENSION ORAL at 13:59

## 2018-06-12 RX ADMIN — VANCOMYCIN HYDROCHLORIDE SCH MG: 500 INJECTION, POWDER, LYOPHILIZED, FOR SOLUTION INTRAVENOUS at 21:42

## 2018-06-12 RX ADMIN — SODIUM CHLORIDE SCH MLS/HR: 0.9 INJECTION, SOLUTION INTRAVENOUS at 09:37

## 2018-06-12 RX ADMIN — NYSTATIN SCH ML: 100000 SUSPENSION ORAL at 21:43

## 2018-06-12 RX ADMIN — VANCOMYCIN HYDROCHLORIDE SCH MG: 500 INJECTION, POWDER, LYOPHILIZED, FOR SOLUTION INTRAVENOUS at 09:38

## 2018-06-12 RX ADMIN — VANCOMYCIN HYDROCHLORIDE SCH MG: 500 INJECTION, POWDER, LYOPHILIZED, FOR SOLUTION INTRAVENOUS at 14:00

## 2018-06-12 RX ADMIN — NYSTATIN SCH ML: 100000 SUSPENSION ORAL at 18:25

## 2018-06-12 NOTE — CP.PCM.PN
Subjective





- Date & Time of Evaluation


Date of Evaluation: 06/12/18


Time of Evaluation: 09:40





- Subjective


Subjective: 





denies abd pain, no diarrhea





Objective





- Vital Signs/Intake and Output


Vital Signs (last 24 hours): 


 











Temp Pulse Resp BP Pulse Ox


 


 97.9 F   86   18   118/80   100 


 


 06/12/18 06:00  06/12/18 06:00  06/12/18 06:00  06/12/18 06:00  06/12/18 06:00








Intake and Output: 


 











 06/12/18 06/12/18





 06:59 18:59


 


Intake Total 1557 


 


Balance 1557 














- Medications


Medications: 


 Current Medications





Albuterol/Ipratropium (Duoneb 3 Mg/0.5 Mg (3 Ml) Ud)  3 ml IH [id number] PRN


   PRN Reason: Shortness of Breath


   Last Admin: 06/09/18 21:05 Dose:  3 ml


Arformoterol Tartrate (Brovana)  15 mcg IH [id number] [facility]


   Last Admin: 06/10/18 08:01 Dose:  Not Given


Budesonide (Pulmicort Respules)  0.5 mg IH [id number] [facility]


   Last Admin: 06/12/18 07:44 Dose:  Not Given


Al Hydrox/Mg Hydrox/Simethicone 30 ml/Diphenhydramine HCl 75 mg/Lidocaine 30 ml

  0 ml PO Q2H PRN


   PRN Reason: Mouth/Throat Pain


   Last Admin: 06/07/18 04:04 Dose:  30 ml


Metronidazole (Flagyl)  500 mg in 100 mls @ 100 mls/hr IVPB Q8 [facility]


   PRN Reason: Protocol


   Last Admin: 06/12/18 05:43 Dose:  100 mls/hr


Potassium Phosphate 15 mmole/ (Sodium Chloride)  255 mls @ 42.5 mls/hr IVPB 

DAILY [facility]


   Last Admin: 06/11/18 13:11 Dose:  42.5 mls/hr


Lidocaine HCl (Lidocaine 2% Viscous)  15 ml MM Q3H PRN


   PRN Reason: Other


   Last Admin: 06/08/18 09:06 Dose:  15 ml


Magnesium Oxide (Mag-Ox)  400 mg PO DAILY [facility]


   Last Admin: 06/11/18 09:11 Dose:  400 mg


Nystatin (Nystatin Oral Susp)  5 ml PO QID [facility]


   Last Admin: 06/11/18 21:54 Dose:  5 ml


Pantoprazole Sodium (Protonix Inj)  40 mg IVP DAILY [facility]


   Last Admin: 06/11/18 09:12 Dose:  40 mg


Ursodiol (Actigall)  300 mg PO BID [facility]


   Last Admin: 06/11/18 18:47 Dose:  300 mg


Vancomycin HCl (Vancocin 25 Mg/Ml (Oral Use))  500 mg PO QID [facility]


   PRN Reason: Protocol


   Last Admin: 06/11/18 22:10 Dose:  500 mg











- Labs


Labs: 


 





 06/11/18 05:20 





 06/11/18 05:20 





 











PT  21.5 SECONDS (9.4-12.5)  H  06/11/18  05:20    


 


INR  1.85  (0.93-1.08)  H  06/11/18  05:20    


 


APTT  30.5 Seconds (25.1-36.5)   06/08/18  09:00    














- Respiratory Exam


Respiratory Exam: Clear to Ausculation Bilateral, NORMAL BREATHING PATTERN





- Cardiovascular Exam


Cardiovascular Exam: REGULAR RHYTHM





- GI/Abdominal Exam


GI & Abdominal Exam: Soft, Normal Bowel Sounds





- Extremities Exam


Extremities Exam: Pedal Edema





- Neurological Exam


Neurological Exam: Alert, Awake





- Skin


Skin Exam: Dry, Warm





Assessment and Plan


(1) Cholangiocarcinoma


Status: Acute   





(2) Neutropenic fever


Status: Acute   





(3) Abdominal pain


Status: Acute   





(4) Hypokalemia


Status: Acute   





(5) Clostridium difficile colitis


Status: Acute   





- Assessment and Plan (Free Text)


Plan: 





continue present rx, advance diet as tolerated, SW for DC planning, check labs 

in am

## 2018-06-12 NOTE — CP.PCM.PN
Subjective





- Date & Time of Evaluation


Date of Evaluation: 06/12/18


Time of Evaluation: 11:30





- Subjective


Subjective: 





No fevers, not in distress, diarrhea is less.





Objective





- Vital Signs/Intake and Output


Vital Signs (last 24 hours): 


 











Temp Pulse Resp BP Pulse Ox


 


 97.9 F   86   18   118/80   100 


 


 06/12/18 06:00  06/12/18 06:00  06/12/18 06:00  06/12/18 06:00  06/12/18 06:00








Intake and Output: 


 











 06/12/18 06/12/18





 06:59 18:59


 


Intake Total 1557 


 


Balance 1557 














- Medications


Medications: 


 Current Medications





Albuterol/Ipratropium (Duoneb 3 Mg/0.5 Mg (3 Ml) Ud)  3 ml IH [id number] PRN


   PRN Reason: Shortness of Breath


   Last Admin: 06/12/18 09:47 Dose:  3 ml


Arformoterol Tartrate (Brovana)  15 mcg IH [id number] [facility]


   Last Admin: 06/10/18 08:01 Dose:  Not Given


Budesonide (Pulmicort Respules)  0.5 mg IH [id number] [facility]


   Last Admin: 06/12/18 09:47 Dose:  0.5 mg


Al Hydrox/Mg Hydrox/Simethicone 30 ml/Diphenhydramine HCl 75 mg/Lidocaine 30 ml

  0 ml PO Q2H PRN


   PRN Reason: Mouth/Throat Pain


   Last Admin: 06/07/18 04:04 Dose:  30 ml


Metronidazole (Flagyl)  500 mg in 100 mls @ 100 mls/hr IVPB Q8 [facility]


   PRN Reason: Protocol


   Last Admin: 06/12/18 05:43 Dose:  100 mls/hr


Potassium Phosphate 15 mmole/ (Sodium Chloride)  255 mls @ 42.5 mls/hr IVPB 

DAILY [facility]


   Last Admin: 06/12/18 09:37 Dose:  42.5 mls/hr


Lidocaine HCl (Lidocaine 2% Viscous)  15 ml MM Q3H PRN


   PRN Reason: Other


   Last Admin: 06/08/18 09:06 Dose:  15 ml


Magnesium Oxide (Mag-Ox)  400 mg PO DAILY [facility]


   Last Admin: 06/12/18 09:39 Dose:  400 mg


Nystatin (Nystatin Oral Susp)  5 ml PO QID [facility]


   Last Admin: 06/12/18 09:39 Dose:  5 ml


Pantoprazole Sodium (Protonix Inj)  40 mg IVP DAILY [facility]


   Last Admin: 06/11/18 09:12 Dose:  40 mg


Ursodiol (Actigall)  300 mg PO BID [facility]


   Last Admin: 06/12/18 09:39 Dose:  300 mg


Vancomycin HCl (Vancocin 25 Mg/Ml (Oral Use))  500 mg PO QID [facility]


   PRN Reason: Protocol


   Last Admin: 06/12/18 09:38 Dose:  500 mg











- Labs


Labs: 


 





 06/11/18 05:20 





 06/11/18 05:20 





 











PT  21.5 SECONDS (9.4-12.5)  H  06/11/18  05:20    


 


INR  1.85  (0.93-1.08)  H  06/11/18  05:20    


 


APTT  30.5 Seconds (25.1-36.5)   06/08/18  09:00    














- Constitutional


Appears: Cachectic, Chronically Ill





- Head Exam


Head Exam: NORMAL INSPECTION





- Respiratory Exam


Respiratory Exam: Decreased Breath Sounds





- Cardiovascular Exam


Cardiovascular Exam: +S1, +S2





- GI/Abdominal Exam


GI & Abdominal Exam: Soft.  absent: Tenderness





Assessment and Plan





- Assessment and Plan (Free Text)


Plan: 





Assessment


Severe Sepsis with febrile neutropenia, due to pancolitis, with c. diff.


history of sepsis from biliary tree as the source in a patient with primary 

sclerosing cholangitis with liver cirrhosis S/P ERCP and biliary stent 

placement - S/P biopsy of the enlarged lymph nodes adjacent to the common bile 

duct


cholangiocarcinoma on chemotherapy


S/P cholecystectomy


Ulcerative colitis


history of MSSA sinusitis


COPD


allergic rhinitis


giant cell arteritis





Plan


stool for C. diff is positive and will continue PO Vancomycin and IV Flagyl day 

6 to complete 10-14 days (will increase dose of PO Vancomycin)


will continue monitor clinically


overall prognosis is poor

## 2018-06-12 NOTE — CP.PCM.PN
<Margo Gomez - Last Filed: 06/12/18 11:24>





Subjective





- Date & Time of Evaluation


Date of Evaluation: 06/12/18


Time of Evaluation: 07:00





- Subjective


Subjective: 


GI Progress Note for GENARO Juarez PGY2





Patient seen and examined at bedside. There were no acute overnight events as 

per nursing staff. This morning nurse was doing bladder scan at bedside and 

patient was retaining 720cc of urine. Mosley was put in place. This morning 

patient reports she has been having some diarrhea and some back pain, but 

denies abdominal pain, chest pain, shortness of breath, nausea/vomiting, fever 

or chills. 





Objective





- Vital Signs/Intake and Output


Vital Signs (last 24 hours): 


 











Temp Pulse Resp BP Pulse Ox


 


 98.4 F   101 H  20   118/72   99 


 


 06/12/18 00:37  06/12/18 00:37  06/12/18 00:37  06/12/18 00:37  06/11/18 23:11








Intake and Output: 


 











 06/12/18 06/12/18





 06:59 18:59


 


Intake Total 1557 


 


Balance 1557 














- Medications


Medications: 


 Current Medications





Albuterol/Ipratropium (Duoneb 3 Mg/0.5 Mg (3 Ml) Ud)  3 ml IH [id number] PRN


   PRN Reason: Shortness of Breath


   Last Admin: 06/09/18 21:05 Dose:  3 ml


Arformoterol Tartrate (Brovana)  15 mcg IH [id number] [facility]


   Last Admin: 06/10/18 08:01 Dose:  Not Given


Budesonide (Pulmicort Respules)  0.5 mg IH [id number] [facility]


   Last Admin: 06/12/18 07:44 Dose:  Not Given


Al Hydrox/Mg Hydrox/Simethicone 30 ml/Diphenhydramine HCl 75 mg/Lidocaine 30 ml

  0 ml PO Q2H PRN


   PRN Reason: Mouth/Throat Pain


   Last Admin: 06/07/18 04:04 Dose:  30 ml


Metronidazole (Flagyl)  500 mg in 100 mls @ 100 mls/hr IVPB Q8 [facility]


   PRN Reason: Protocol


   Last Admin: 06/12/18 05:43 Dose:  100 mls/hr


Dextrose/Sodium Chloride (Dextrose 5%/0.9% Ns 1000 Ml)  1,000 mls @ 100 mls/hr 

IV .Q10H [facility]


   Last Admin: 06/11/18 19:06 Dose:  100 mls/hr


Potassium Phosphate 15 mmole/ (Sodium Chloride)  255 mls @ 42.5 mls/hr IVPB 

DAILY [facility]


   Last Admin: 06/11/18 13:11 Dose:  42.5 mls/hr


Lidocaine HCl (Lidocaine 2% Viscous)  15 ml MM Q3H PRN


   PRN Reason: Other


   Last Admin: 06/08/18 09:06 Dose:  15 ml


Magnesium Oxide (Mag-Ox)  400 mg PO DAILY [facility]


   Last Admin: 06/11/18 09:11 Dose:  400 mg


Nystatin (Nystatin Oral Susp)  5 ml PO QID [facility]


   Last Admin: 06/11/18 21:54 Dose:  5 ml


Pantoprazole Sodium (Protonix Inj)  40 mg IVP DAILY [facility]


   Last Admin: 06/11/18 09:12 Dose:  40 mg


Ursodiol (Actigall)  300 mg PO BID [facility]


   Last Admin: 06/11/18 18:47 Dose:  300 mg


Vancomycin HCl (Vancocin 25 Mg/Ml (Oral Use))  500 mg PO QID [facility]


   PRN Reason: Protocol


   Last Admin: 06/11/18 22:10 Dose:  500 mg











- Labs


Labs: 


 





 06/11/18 05:20 





 06/11/18 05:20 





 











PT  21.5 SECONDS (9.4-12.5)  H  06/11/18  05:20    


 


INR  1.85  (0.93-1.08)  H  06/11/18  05:20    


 


APTT  30.5 Seconds (25.1-36.5)   06/08/18  09:00    














- Constitutional


Appears: Chronically Ill





- Head Exam


Head Exam: ATRAUMATIC, NORMAL INSPECTION, NORMOCEPHALIC





- Eye Exam


Eye Exam: Scleral icterus


Pupil Exam: NORMAL ACCOMODATION





- ENT Exam


ENT Exam: Mucous Membranes Moist





- Respiratory Exam


Respiratory Exam: Clear to Ausculation Bilateral, NORMAL BREATHING PATTERN.  

absent: Rales, Rhonchi, Wheezes





- Cardiovascular Exam


Cardiovascular Exam: REGULAR RHYTHM, +S1, +S2.  absent: Gallop, Rubs, Murmur





- GI/Abdominal Exam


GI & Abdominal Exam: Soft, Normal Bowel Sounds.  absent: Tenderness, Mass, 

Rebound





- Extremities Exam


Extremities Exam: Pedal Edema





- Neurological Exam


Neurological Exam: Alert, Awake, CN II-XII Intact





- Psychiatric Exam


Psychiatric exam: Normal Affect, Normal Mood





- Skin


Skin Exam: Dry, Warm





Assessment and Plan





- Assessment and Plan (Free Text)


Assessment: 





This is a 72yo female with past medical history COPD, giant cell arteritis, 

Ulcerative colitis, cholangiocarcinoma s/p cholecystectomy and chemotherapy 2 

weeks who is admitted 





1. C.diff positive 


2. Sepsis with neutropenic fever secondary to pancolitis 


3. Oral thrush


4. Pancytopenia (improved)


5. cholangiocarcinoma on chemotherapy 


6. Primary sclerosing cholangitis 


7. Cirrhosis s/p ERCP with biliary stent 


8. Ulcerative colitis


9. COPD


10. Giant cell arteritis  





Plan: 


Will increase patient diet to soft/low fat. If patient does not tolerate will 

go back to clear liquis. Continue IV antibiotics as well as oral Vanco and 

Nystatin. Patient is on contact precautions. ID is on consult. Patient has 

anasarca. Oncology recommendations appreciated.  Patient is DNR/DNI. Palliative 

care is following. Prognosis is guarded. 





Case seen, discussed and reviewed with Dr. Valencia. 


GENARO Gomez PGY2











<Nick Valencia V - Last Filed: 06/12/18 23:05>





Objective





- Vital Signs/Intake and Output


Vital Signs (last 24 hours): 


 











Temp Pulse Resp BP Pulse Ox


 


 97.5 F L  99 H  20   123/80   100 


 


 06/12/18 14:00  06/12/18 14:00  06/12/18 14:00  06/12/18 14:00  06/12/18 14:00








Intake and Output: 


 











 06/12/18 06/13/18





 18:59 06:59


 


Intake Total 1080 300


 


Output Total 500 150


 


Balance 580 150














- Medications


Medications: 


 Current Medications





Albuterol/Ipratropium (Duoneb 3 Mg/0.5 Mg (3 Ml) Ud)  3 ml IH [id number] PRN


   PRN Reason: Shortness of Breath


   Last Admin: 06/12/18 09:47 Dose:  3 ml


Arformoterol Tartrate (Brovana)  15 mcg IH [id number] [facility]


   Last Admin: 06/10/18 08:01 Dose:  Not Given


Budesonide (Pulmicort Respules)  0.5 mg IH [id number] [facility]


   Last Admin: 06/12/18 20:55 Dose:  Not Given


Al Hydrox/Mg Hydrox/Simethicone 30 ml/Diphenhydramine HCl 75 mg/Lidocaine 30 ml

  0 ml PO Q2H PRN


   PRN Reason: Mouth/Throat Pain


   Last Admin: 06/07/18 04:04 Dose:  30 ml


Metronidazole (Flagyl)  500 mg in 100 mls @ 100 mls/hr IVPB Q8 [facility]


   PRN Reason: Protocol


   Last Admin: 06/12/18 21:42 Dose:  100 mls/hr


Potassium Phosphate 15 mmole/ (Sodium Chloride)  255 mls @ 42.5 mls/hr IVPB 

DAILY [facility]


   Last Admin: 06/12/18 09:37 Dose:  42.5 mls/hr


Lidocaine HCl (Lidocaine 2% Viscous)  15 ml MM Q3H PRN


   PRN Reason: Other


   Last Admin: 06/08/18 09:06 Dose:  15 ml


Magnesium Oxide (Mag-Ox)  400 mg PO DAILY [facility]


   Last Admin: 06/12/18 09:39 Dose:  400 mg


Nystatin (Nystatin Oral Susp)  5 ml PO QID [facility]


   Last Admin: 06/12/18 21:43 Dose:  5 ml


Pantoprazole Sodium (Protonix Inj)  40 mg IVP DAILY [facility]


   Last Admin: 06/12/18 10:48 Dose:  40 mg


Ursodiol (Actigall)  300 mg PO BID [facility]


   Last Admin: 06/12/18 18:26 Dose:  300 mg


Vancomycin HCl (Vancocin 25 Mg/Ml (Oral Use))  500 mg PO QID [facility]


   PRN Reason: Protocol


   Last Admin: 06/12/18 21:42 Dose:  500 mg











- Labs


Labs: 


 





 06/11/18 05:20 





 06/11/18 05:20 





 











PT  21.5 SECONDS (9.4-12.5)  H  06/11/18  05:20    


 


INR  1.85  (0.93-1.08)  H  06/11/18  05:20    


 


APTT  30.5 Seconds (25.1-36.5)   06/08/18  09:00    














Attending/Attestation





- Attestation


I have personally seen and examined this patient.: Yes


I have fully participated in the care of the patient.: Yes


I have reviewed all pertinent clinical information, including history, physical 

exam and plan: Yes


Notes (Text): 


This is an addendum to GI progress  report dictated by the Medical Resident.The 

patient was seen and examined earlier.  Medical records, lab studies, imagings 

were reviewed.  Last 24 hours events reviewed.  Agreed with the above treatment 

plan as outlined in Medical Resident 's notes the with the addition of the 

following 


on examination abdomen softly distended


Has a Mosley for urinary retention


Stool softeners for constipation


On po vanco for C. difficile


Continue antibiotics as per ID


06/12/18 22:59





06/12/18 23:01

## 2018-06-13 LAB
BASOPHILS # BLD AUTO: 0.07 K/MM3 (ref 0–2)
BASOPHILS NFR BLD: 0.3 % (ref 0–3)
EOSINOPHIL # BLD: 0 10*3/UL (ref 0–0.7)
EOSINOPHIL NFR BLD: 0 % (ref 1.5–5)
ERYTHROCYTE [DISTWIDTH] IN BLOOD BY AUTOMATED COUNT: 21.5 % (ref 11.5–14.5)
GRANULOCYTES # BLD: 19.18 10*3/UL (ref 1.4–6.5)
GRANULOCYTES NFR BLD: 80.8 % (ref 50–68)
HGB BLD-MCNC: 10.5 G/DL (ref 12–16)
LYMPHOCYTES # BLD: 2.2 10*3/UL (ref 1.2–3.4)
LYMPHOCYTES NFR BLD AUTO: 9.3 % (ref 22–35)
MCH RBC QN AUTO: 30.5 PG (ref 25–35)
MCHC RBC AUTO-ENTMCNC: 33.2 G/DL (ref 31–37)
MCV RBC AUTO: 91.9 FL (ref 80–105)
MONOCYTES # BLD AUTO: 2.3 10*3/UL (ref 0.1–0.6)
MONOCYTES NFR BLD: 9.6 % (ref 1–6)
PLATELET # BLD: 151 10^3/UL (ref 120–450)
PMV BLD AUTO: 11.6 FL (ref 7–11)
RBC # BLD AUTO: 3.44 10^6/UL (ref 3.5–6.1)
WBC # BLD AUTO: 23.7 10^3/UL (ref 4.5–11)

## 2018-06-13 RX ADMIN — VANCOMYCIN HYDROCHLORIDE SCH MG: 500 INJECTION, POWDER, LYOPHILIZED, FOR SOLUTION INTRAVENOUS at 10:31

## 2018-06-13 RX ADMIN — METRONIDAZOLE SCH MLS/HR: 500 INJECTION, SOLUTION INTRAVENOUS at 05:54

## 2018-06-13 RX ADMIN — NYSTATIN SCH ML: 100000 SUSPENSION ORAL at 22:33

## 2018-06-13 RX ADMIN — NYSTATIN SCH ML: 100000 SUSPENSION ORAL at 10:30

## 2018-06-13 RX ADMIN — METRONIDAZOLE SCH MLS/HR: 500 INJECTION, SOLUTION INTRAVENOUS at 15:45

## 2018-06-13 RX ADMIN — NYSTATIN SCH ML: 100000 SUSPENSION ORAL at 15:00

## 2018-06-13 RX ADMIN — SODIUM CHLORIDE SCH MLS/HR: 0.9 INJECTION, SOLUTION INTRAVENOUS at 10:31

## 2018-06-13 RX ADMIN — BUDESONIDE SCH: 0.5 SUSPENSION RESPIRATORY (INHALATION) at 19:52

## 2018-06-13 RX ADMIN — NYSTATIN SCH ML: 100000 SUSPENSION ORAL at 19:33

## 2018-06-13 RX ADMIN — VANCOMYCIN HYDROCHLORIDE SCH MG: 500 INJECTION, POWDER, LYOPHILIZED, FOR SOLUTION INTRAVENOUS at 22:34

## 2018-06-13 RX ADMIN — VANCOMYCIN HYDROCHLORIDE SCH MG: 500 INJECTION, POWDER, LYOPHILIZED, FOR SOLUTION INTRAVENOUS at 15:00

## 2018-06-13 RX ADMIN — VANCOMYCIN HYDROCHLORIDE SCH MG: 500 INJECTION, POWDER, LYOPHILIZED, FOR SOLUTION INTRAVENOUS at 19:33

## 2018-06-13 RX ADMIN — Medication SCH MG: at 10:30

## 2018-06-13 RX ADMIN — METRONIDAZOLE SCH MLS/HR: 500 INJECTION, SOLUTION INTRAVENOUS at 22:33

## 2018-06-13 RX ADMIN — BUDESONIDE SCH: 0.5 SUSPENSION RESPIRATORY (INHALATION) at 07:39

## 2018-06-13 NOTE — CON
DATE:  06/12/2018



REASON FOR CONSULTATION:  Metastatic biliary tract cancer.



HISTORY OF PRESENT ILLNESS:  The patient is a 73-year-old female with past

medical history significant for sclerosing cholangitis for several years,

anxiety as well as multiple other medical problems including ulcerative

colitis who presented to the emergency room with complaints of diarrhea as

well as weakness.  Patient has been getting chemotherapy as an outpatient

with me, with Gemzar and Abraxane, and her last chemotherapy prior to the

admission was one week prior.  She was found to have neutropenic sepsis and

now has undergone several transfusions as well as Neupogen for several days

and counts are much better.  She is still very fatigued and has not been

eating as she has nausea still.  She did have some ascites, intrahepatic

dilatation and did have a metal biliary stent that was placed a few weeks

ago.  She denies any further complaints at this point and is feeling much

better.



PAST MEDICAL HISTORY:  As above.  COPD, metastatic cholangiocarcinoma,

history of sclerosing cholangitis, anxiety.



PAST SURGICAL HISTORY:  Significant for cholecystectomy, sinus surgeries.



FAMILY HISTORY:  Noncontributory.



ALLERGIES:  SHE IS ALLERGIC TO ASPIRIN AND IBUPROFEN, SHE HAS ANAPHYLACTIC

REACTIONS TO THEM.



SOCIAL HISTORY:  Noncontributory.  She is not a smoker, no alcohol use, no

drug use.  Lives alone.  Her family is very involved in her care though.



REVIEW OF SYSTEMS:  As per the HPI.



PHYSICAL EXAMINATION:

VITAL SIGNS:  Reveal a temperature 97.9, pulse of 86, respiratory rate 18,

blood pressure 118/80.

GENERAL:  Patient is an elderly frail appearing female lying in bed, in no

acute distress.

HEAD AND NECK:  Normocephalic and atraumatic.  Eyes; pupils are equal,

round, reactive to light and accommodation.  Extraocular muscles are

intact.  There is pallor.  Anicteric both noted.  Neck is supple with no

adenopathy, no JVD, and no thyromegaly.

LUNGS:  Clear to auscultation bilaterally with no rales or rhonchi.

CARDIOVASCULAR:  S1 and S2 is heard.

ABDOMEN:  Positive bowel sounds and soft.  Mild diffuse tenderness.

EXTREMITIES:  There is no edema.



LABORATORY DATA:  Reveal a white count of 50.6, post Neupogen, hemoglobin

of 8.9, hematocrit of 27.7 and MCV of 94.9, platelet count of 145. 

Chemistries are now within normal limits.  BUN and creatinine is 10 and

0.6.  Potassium is low at 3, otherwise within normal limits.  Her total

bili is also down to 3.5.  AST, ALT and alk phos are also improving since

admission.  As initially her T bili was over 7.9.



DIAGNOSTIC DATA:  She also had a CT of the abdomen, pelvis done upon

admission, which was suggestive of pancolitis and mesenteric reaction.  She

also had some intrahepatic biliary dilatation and no other acute findings.



ASSESSMENT AND PLAN:  Elderly female with metastatic cholangiocarcinoma

recently diagnosed and with history of sclerosing cholangitis.  She has

undergone 2 cycles of chemotherapy with Gemzar and Abraxane. 

Unfortunately, patient was admitted with neutropenic sepsis despite the use

of Neulasta in the outpatient setting.  She also has pancolitis at this

point.  Continue intravenous antibiotics as per Infectious Disease. 

Neupogen has been discontinued.  She also had severe anemia.  Hemoglobin is

now not repeated, post packed red blood cells transfusion.  A long

discussion with patient's daughter yesterday regarding further chemotherapy

plans.  At this point, patient would prefer not to continue chemotherapy. 

She will be going to rehab facility for further rehab.  Repeat CBC and CMP

ordered.



Thank you for the consult.  We will follow.







__________________________________________

Isaías Romano MD



DD:  06/12/2018 14:48:32

DT:  06/13/2018 1:51:26

Job # [id number]

## 2018-06-13 NOTE — CP.PCM.PN
Subjective





- Date & Time of Evaluation


Date of Evaluation: 06/13/18


Time of Evaluation: 09:20





- Subjective


Subjective: 





c/o back pain, otherwise NAD





Objective





- Vital Signs/Intake and Output


Vital Signs (last 24 hours): 


 











Temp Pulse Resp BP Pulse Ox


 


 99 F   100 H  20   114/78   99 


 


 06/13/18 06:00  06/13/18 06:00  06/13/18 06:00  06/13/18 06:00  06/13/18 06:00








Intake and Output: 


 











 06/13/18 06/13/18





 06:59 18:59


 


Intake Total 300 


 


Output Total 150 


 


Balance 150 














- Medications


Medications: 


 Current Medications





Albuterol/Ipratropium (Duoneb 3 Mg/0.5 Mg (3 Ml) Ud)  3 ml IH [id number] PRN


   PRN Reason: Shortness of Breath


   Last Admin: 06/12/18 09:47 Dose:  3 ml


Arformoterol Tartrate (Brovana)  15 mcg IH [id number] [facility]


   Last Admin: 06/10/18 08:01 Dose:  Not Given


Budesonide (Pulmicort Respules)  0.5 mg IH [id number] [facility]


   Last Admin: 06/13/18 07:39 Dose:  Not Given


Al Hydrox/Mg Hydrox/Simethicone 30 ml/Diphenhydramine HCl 75 mg/Lidocaine 30 ml

  0 ml PO Q2H PRN


   PRN Reason: Mouth/Throat Pain


   Last Admin: 06/07/18 04:04 Dose:  30 ml


Metronidazole (Flagyl)  500 mg in 100 mls @ 100 mls/hr IVPB Q8 [facility]


   PRN Reason: Protocol


   Last Admin: 06/13/18 05:54 Dose:  100 mls/hr


Potassium Phosphate 15 mmole/ (Sodium Chloride)  255 mls @ 42.5 mls/hr IVPB 

DAILY [facility]


   Last Admin: 06/12/18 09:37 Dose:  42.5 mls/hr


Lidocaine HCl (Lidocaine 2% Viscous)  15 ml MM Q3H PRN


   PRN Reason: Other


   Last Admin: 06/08/18 09:06 Dose:  15 ml


Magnesium Oxide (Mag-Ox)  400 mg PO DAILY [facility]


   Last Admin: 06/12/18 09:39 Dose:  400 mg


Nystatin (Nystatin Oral Susp)  5 ml PO QID [facility]


   Last Admin: 06/12/18 21:43 Dose:  5 ml


Oxycodone/Acetaminophen (Percocet 5/325 Mg Tab)  1 tab PO Q6H PRN


   PRN Reason: Pain, moderate (4-7)


   Stop: 06/16/18 09:31


Pantoprazole Sodium (Protonix Inj)  40 mg IVP DAILY [facility]


   Last Admin: 06/12/18 10:48 Dose:  40 mg


Ursodiol (Actigall)  300 mg PO BID [facility]


   Last Admin: 06/12/18 18:26 Dose:  300 mg


Vancomycin HCl (Vancocin 25 Mg/Ml (Oral Use))  500 mg PO QID [facility]


   PRN Reason: Protocol


   Last Admin: 06/12/18 21:42 Dose:  500 mg











- Labs


Labs: 


 





 06/13/18 06:45 





 06/11/18 05:20 





 











PT  21.5 SECONDS (9.4-12.5)  H  06/11/18  05:20    


 


INR  1.85  (0.93-1.08)  H  06/11/18  05:20    


 


APTT  30.5 Seconds (25.1-36.5)   06/08/18  09:00    














- Respiratory Exam


Respiratory Exam: Clear to Ausculation Bilateral, NORMAL BREATHING PATTERN





- Cardiovascular Exam


Cardiovascular Exam: REGULAR RHYTHM





- GI/Abdominal Exam


GI & Abdominal Exam: Soft, Normal Bowel Sounds





- Extremities Exam


Extremities Exam: Pedal Edema





- Neurological Exam


Neurological Exam: Alert, Awake





Assessment and Plan


(1) Cholangiocarcinoma


Status: Acute   





(2) Neutropenic fever


Status: Acute   





(3) Abdominal pain


Status: Acute   





(4) Hypokalemia


Status: Acute   





(5) Clostridium difficile colitis


Assessment & Plan: 


check labs in am , Percocet 5/325 q6 prn pain, SW for disposition possible 

hospice


Status: Acute

## 2018-06-13 NOTE — CP.PCM.PN
Subjective





- Date & Time of Evaluation


Date of Evaluation: 06/13/18


Time of Evaluation: 10:00





- Subjective


Subjective: 





Alert, oriented. Having frequent loose BM's 





Objective





- Vital Signs/Intake and Output


Vital Signs (last 24 hours): 


 











Temp Pulse Resp BP Pulse Ox


 


 99 F   100 H  20   114/78   99 


 


 06/13/18 06:00  06/13/18 06:00  06/13/18 06:00  06/13/18 06:00  06/13/18 06:00








Intake and Output: 


 











 06/13/18 06/13/18





 06:59 18:59


 


Intake Total 300 


 


Output Total 150 


 


Balance 150 














- Medications


Medications: 


 Current Medications





Albuterol/Ipratropium (Duoneb 3 Mg/0.5 Mg (3 Ml) Ud)  3 ml IH [id number] PRN


   PRN Reason: Shortness of Breath


   Last Admin: 06/12/18 09:47 Dose:  3 ml


Arformoterol Tartrate (Brovana)  15 mcg IH [id number] [facility]


   Last Admin: 06/10/18 08:01 Dose:  Not Given


Budesonide (Pulmicort Respules)  0.5 mg IH [id number] [facility]


   Last Admin: 06/13/18 07:39 Dose:  Not Given


Al Hydrox/Mg Hydrox/Simethicone 30 ml/Diphenhydramine HCl 75 mg/Lidocaine 30 ml

  0 ml PO Q2H PRN


   PRN Reason: Mouth/Throat Pain


   Last Admin: 06/07/18 04:04 Dose:  30 ml


Metronidazole (Flagyl)  500 mg in 100 mls @ 100 mls/hr IVPB Q8 [facility]


   PRN Reason: Protocol


   Last Admin: 06/13/18 05:54 Dose:  100 mls/hr


Potassium Phosphate 15 mmole/ (Sodium Chloride)  255 mls @ 42.5 mls/hr IVPB 

DAILY [facility]


   Last Admin: 06/12/18 09:37 Dose:  42.5 mls/hr


Lidocaine HCl (Lidocaine 2% Viscous)  15 ml MM Q3H PRN


   PRN Reason: Other


   Last Admin: 06/08/18 09:06 Dose:  15 ml


Magnesium Oxide (Mag-Ox)  400 mg PO DAILY [facility]


   Last Admin: 06/12/18 09:39 Dose:  400 mg


Nystatin (Nystatin Oral Susp)  5 ml PO QID [facility]


   Last Admin: 06/12/18 21:43 Dose:  5 ml


Oxycodone/Acetaminophen (Percocet 5/325 Mg Tab)  1 tab PO Q6H PRN


   PRN Reason: Pain, moderate (4-7)


   Stop: 06/16/18 09:31


Pantoprazole Sodium (Protonix Inj)  40 mg IVP DAILY [facility]


   Last Admin: 06/12/18 10:48 Dose:  40 mg


Ursodiol (Actigall)  300 mg PO BID [facility]


   Last Admin: 06/12/18 18:26 Dose:  300 mg


Vancomycin HCl (Vancocin 25 Mg/Ml (Oral Use))  500 mg PO QID [facility]


   PRN Reason: Protocol


   Last Admin: 06/12/18 21:42 Dose:  500 mg











- Labs


Labs: 


 





 06/13/18 06:45 





 06/11/18 05:20 





 











PT  21.5 SECONDS (9.4-12.5)  H  06/11/18  05:20    


 


INR  1.85  (0.93-1.08)  H  06/11/18  05:20    


 


APTT  30.5 Seconds (25.1-36.5)   06/08/18  09:00    














- Constitutional


Appears: Chronically Ill





- Eye Exam


Eye Exam: Normal appearance, Scleral icterus





- ENT Exam


ENT Exam: Mucous Membranes Moist, Normal Oropharynx





- Respiratory Exam


Respiratory Exam: Decreased Breath Sounds, NORMAL BREATHING PATTERN





- Cardiovascular Exam


Cardiovascular Exam: REGULAR RHYTHM, +S1, +S2





- GI/Abdominal Exam


GI & Abdominal Exam: Distended, Soft, Hyperactive Bowel Sounds





- Extremities Exam


Additional comments: 





2 + edema of lower extremities





- Back Exam


Back Exam: NORMAL INSPECTION





- Neurological Exam


Neurological Exam: Alert, Oriented x3





- Skin


Skin Exam: Dry, Pallor





Assessment and Plan





- Assessment and Plan (Free Text)


Assessment: 





73 year old female with history of who was admitted with sepsis,colitis, 

pancytopenia which has since resolved, now with C Diff.





She complains of weakness, frequent loose BM's. She has not gotten out of bed . 

Encouraged to participate with PT /OT. Plan is for [facility] once medically cleared. 

Patient is DNR/DNI.  Previously discussed hospice care with with daughter 

Aroldo. Family unable to provide hospice care at home. Will transition to 

facility with hospice in the future.


Plan: 





Goals of care





ID:  Continue IV antibiotics, Flagyl and Vanco as per ID recondensation





Anemia: [place]or CBC, transfuse as needed.





Deconditioning: PT/OT, SATNAM.

## 2018-06-13 NOTE — CP.PCM.PN
Subjective





- Date & Time of Evaluation


Date of Evaluation: 06/13/18


Time of Evaluation: 12:15





- Subjective


Subjective: 





Diarrhea is improving, no fevers.





Objective





- Vital Signs/Intake and Output


Vital Signs (last 24 hours): 


 











Temp Pulse Resp BP Pulse Ox


 


 99 F   100 H  20   114/78   99 


 


 06/13/18 06:00  06/13/18 06:00  06/13/18 06:00  06/13/18 06:00  06/13/18 06:00








Intake and Output: 


 











 06/13/18 06/13/18





 06:59 18:59


 


Intake Total 300 


 


Output Total 150 


 


Balance 150 














- Medications


Medications: 


 Current Medications





Albuterol/Ipratropium (Duoneb 3 Mg/0.5 Mg (3 Ml) Ud)  3 ml IH [id number] PRN


   PRN Reason: Shortness of Breath


   Last Admin: 06/12/18 09:47 Dose:  3 ml


Arformoterol Tartrate (Brovana)  15 mcg IH [id number] [facility]


   Last Admin: 06/10/18 08:01 Dose:  Not Given


Budesonide (Pulmicort Respules)  0.5 mg IH [id number] [facility]


   Last Admin: 06/13/18 07:39 Dose:  Not Given


Al Hydrox/Mg Hydrox/Simethicone 30 ml/Diphenhydramine HCl 75 mg/Lidocaine 30 ml

  0 ml PO Q2H PRN


   PRN Reason: Mouth/Throat Pain


   Last Admin: 06/07/18 04:04 Dose:  30 ml


Metronidazole (Flagyl)  500 mg in 100 mls @ 100 mls/hr IVPB Q8 [facility]


   PRN Reason: Protocol


   Last Admin: 06/13/18 05:54 Dose:  100 mls/hr


Potassium Phosphate 15 mmole/ (Sodium Chloride)  255 mls @ 42.5 mls/hr IVPB 

DAILY [facility]


   Last Admin: 06/12/18 09:37 Dose:  42.5 mls/hr


Lidocaine HCl (Lidocaine 2% Viscous)  15 ml MM Q3H PRN


   PRN Reason: Other


   Last Admin: 06/08/18 09:06 Dose:  15 ml


Magnesium Oxide (Mag-Ox)  400 mg PO DAILY [facility]


   Last Admin: 06/12/18 09:39 Dose:  400 mg


Nystatin (Nystatin Oral Susp)  5 ml PO QID [facility]


   Last Admin: 06/12/18 21:43 Dose:  5 ml


Oxycodone/Acetaminophen (Percocet 5/325 Mg Tab)  1 tab PO Q6H PRN


   PRN Reason: Pain, moderate (4-7)


   Stop: 06/16/18 09:31


Pantoprazole Sodium (Protonix Inj)  40 mg IVP DAILY [facility]


   Last Admin: 06/12/18 10:48 Dose:  40 mg


Ursodiol (Actigall)  300 mg PO BID [facility]


   Last Admin: 06/12/18 18:26 Dose:  300 mg


Vancomycin HCl (Vancocin 25 Mg/Ml (Oral Use))  500 mg PO QID [facility]


   PRN Reason: Protocol


   Last Admin: 06/12/18 21:42 Dose:  500 mg











- Labs


Labs: 


 





 06/13/18 06:45 





 06/11/18 05:20 





 











PT  21.5 SECONDS (9.4-12.5)  H  06/11/18  05:20    


 


INR  1.85  (0.93-1.08)  H  06/11/18  05:20    


 


APTT  30.5 Seconds (25.1-36.5)   06/08/18  09:00    














- Constitutional


Appears: Chronically Ill





- Head Exam


Head Exam: NORMAL INSPECTION





- ENT Exam


ENT Exam: Mucous Membranes Moist





- Neck Exam


Neck Exam: absent: Meningismus





- Respiratory Exam


Respiratory Exam: Decreased Breath Sounds





- Cardiovascular Exam


Cardiovascular Exam: +S1, +S2





- GI/Abdominal Exam


GI & Abdominal Exam: Soft.  absent: Tenderness





Assessment and Plan





- Assessment and Plan (Free Text)


Plan: 





Assessment


Severe Sepsis with febrile neutropenia, due to pancolitis, with c. diff.


history of sepsis from biliary tree as the source in a patient with primary 

sclerosing cholangitis with liver cirrhosis S/P ERCP and biliary stent 

placement - S/P biopsy of the enlarged lymph nodes adjacent to the common bile 

duct


cholangiocarcinoma on chemotherapy


S/P cholecystectomy


Ulcerative colitis


history of MSSA sinusitis


COPD


allergic rhinitis


giant cell arteritis





Plan


stool for C. diff is positive and will continue PO Vancomycin and IV Flagyl day 

7 to complete 10-14 days 


will continue monitor clinically


overall prognosis is poor

## 2018-06-13 NOTE — CP.PCM.PN
Subjective





- Date & Time of Evaluation


Date of Evaluation: 06/13/18


Time of Evaluation: 10:15





- Subjective


Subjective: 





Seen and examined at the bedside this morning, chart reviewed.  No reports of 

acute coronary events.  Patient reporting mushy stools, no reports of overt GI 

bleed.  Patient currently eating banana.  Denies nausea, vomiting, or abdominal 

pain.





Objective





- Vital Signs/Intake and Output


Vital Signs (last 24 hours): 


 











Temp Pulse Resp BP Pulse Ox


 


 99 F   100 H  20   114/78   99 


 


 06/13/18 06:00  06/13/18 06:00  06/13/18 06:00  06/13/18 06:00  06/13/18 06:00








Intake and Output: 


 











 06/13/18 06/13/18





 06:59 18:59


 


Intake Total 300 


 


Output Total 150 


 


Balance 150 














- Medications


Medications: 


 Current Medications





Albuterol/Ipratropium (Duoneb 3 Mg/0.5 Mg (3 Ml) Ud)  3 ml IH [id number] PRN


   PRN Reason: Shortness of Breath


   Last Admin: 06/12/18 09:47 Dose:  3 ml


Arformoterol Tartrate (Brovana)  15 mcg IH [id number] [facility]


   Last Admin: 06/10/18 08:01 Dose:  Not Given


Budesonide (Pulmicort Respules)  0.5 mg IH [id number] [facility]


   Last Admin: 06/13/18 07:39 Dose:  Not Given


Al Hydrox/Mg Hydrox/Simethicone 30 ml/Diphenhydramine HCl 75 mg/Lidocaine 30 ml

  0 ml PO Q2H PRN


   PRN Reason: Mouth/Throat Pain


   Last Admin: 06/07/18 04:04 Dose:  30 ml


Metronidazole (Flagyl)  500 mg in 100 mls @ 100 mls/hr IVPB Q8 [facility]


   PRN Reason: Protocol


   Last Admin: 06/13/18 05:54 Dose:  100 mls/hr


Potassium Phosphate 15 mmole/ (Sodium Chloride)  255 mls @ 42.5 mls/hr IVPB 

DAILY [facility]


   Last Admin: 06/13/18 10:31 Dose:  42.5 mls/hr


Lidocaine HCl (Lidocaine 2% Viscous)  15 ml MM Q3H PRN


   PRN Reason: Other


   Last Admin: 06/08/18 09:06 Dose:  15 ml


Magnesium Oxide (Mag-Ox)  400 mg PO DAILY [facility]


   Last Admin: 06/13/18 10:30 Dose:  400 mg


Nystatin (Nystatin Oral Susp)  5 ml PO QID [facility]


   Last Admin: 06/13/18 10:30 Dose:  5 ml


Oxycodone/Acetaminophen (Percocet 5/325 Mg Tab)  1 tab PO Q6H PRN


   PRN Reason: Pain, moderate (4-7)


   Stop: 06/16/18 09:31


Pantoprazole Sodium (Protonix Inj)  40 mg IVP DAILY [facility]


   Last Admin: 06/13/18 10:30 Dose:  40 mg


Ursodiol (Actigall)  300 mg PO BID [facility]


   Last Admin: 06/13/18 10:31 Dose:  300 mg


Vancomycin HCl (Vancocin 25 Mg/Ml (Oral Use))  500 mg PO QID [facility]


   PRN Reason: Protocol


   Last Admin: 06/13/18 10:31 Dose:  500 mg











- Labs


Labs: 


 





 06/13/18 06:45 





 06/11/18 05:20 





 











PT  21.5 SECONDS (9.4-12.5)  H  06/11/18  05:20    


 


INR  1.85  (0.93-1.08)  H  06/11/18  05:20    


 


APTT  30.5 Seconds (25.1-36.5)   06/08/18  09:00    














- Constitutional


Appears: No Acute Distress





- Eye Exam


Eye Exam: Scleral icterus





- ENT Exam


ENT Exam: Mucous Membranes Moist





- Respiratory Exam


Respiratory Exam: NORMAL BREATHING PATTERN.  absent: Respiratory Distress





- Cardiovascular Exam


Cardiovascular Exam: +S1, +S2





- GI/Abdominal Exam


GI & Abdominal Exam: Soft, Normal Bowel Sounds.  absent: Guarding, Tenderness, 

Rebound





- Extremities Exam


Extremities Exam: Pedal Edema.  absent: Calf Tenderness





- Neurological Exam


Neurological Exam: Alert, Awake, Oriented x3





- Skin


Skin Exam: Dry, Warm





Assessment and Plan





- Assessment and Plan (Free Text)


Assessment: 





Assessment: 





Cdiff Positive


Improved Odynphagia, oral thrush


Sepsis with neutropenic fever secondary to pancolitis 


Pancytopenia 


Cholangiocarcinoma on chemotherapy 


Primary sclerosing cholangitis 


Cirrhosis s/p ERCP with biliary stent 


Ulcerative colitis


COPD


Giant cell arteritis  





Plan:


 


On IV antibiotics as per ID


on oral Vancomycin


on Nystatin


continue to monitor LFTs and CBC


diet as tolerated


as per oncology





Seen and discussed with Dr. Valencia.

## 2018-06-14 LAB
ALBUMIN SERPL-MCNC: 1.8 G/DL (ref 3–4.8)
ALBUMIN/GLOB SERPL: 0.6 {RATIO} (ref 1.1–1.8)
ALT SERPL-CCNC: 36 U/L (ref 7–56)
AST SERPL-CCNC: 55 U/L (ref 14–36)
BASOPHILS # BLD AUTO: 0.03 K/MM3 (ref 0–2)
BASOPHILS NFR BLD: 0.2 % (ref 0–3)
BUN SERPL-MCNC: 14 MG/DL (ref 7–21)
CALCIUM SERPL-MCNC: 8 MG/DL (ref 8.4–10.5)
EOSINOPHIL # BLD: 0 10*3/UL (ref 0–0.7)
EOSINOPHIL NFR BLD: 0.1 % (ref 1.5–5)
ERYTHROCYTE [DISTWIDTH] IN BLOOD BY AUTOMATED COUNT: 21.3 % (ref 11.5–14.5)
GFR NON-AFRICAN AMERICAN: > 60
GRANULOCYTES # BLD: 13.66 10*3/UL (ref 1.4–6.5)
GRANULOCYTES NFR BLD: 78.7 % (ref 50–68)
HGB BLD-MCNC: 10 G/DL (ref 12–16)
LYMPHOCYTES # BLD: 2.2 10*3/UL (ref 1.2–3.4)
LYMPHOCYTES NFR BLD AUTO: 12.4 % (ref 22–35)
MCH RBC QN AUTO: 30.4 PG (ref 25–35)
MCHC RBC AUTO-ENTMCNC: 32.6 G/DL (ref 31–37)
MCV RBC AUTO: 93.3 FL (ref 80–105)
MONOCYTES # BLD AUTO: 1.5 10*3/UL (ref 0.1–0.6)
MONOCYTES NFR BLD: 8.6 % (ref 1–6)
PLATELET # BLD: 138 10^3/UL (ref 120–450)
PMV BLD AUTO: 11.2 FL (ref 7–11)
RBC # BLD AUTO: 3.29 10^6/UL (ref 3.5–6.1)
WBC # BLD AUTO: 17.4 10^3/UL (ref 4.5–11)

## 2018-06-14 RX ADMIN — NYSTATIN SCH: 100000 SUSPENSION ORAL at 23:29

## 2018-06-14 RX ADMIN — METRONIDAZOLE SCH MLS/HR: 500 INJECTION, SOLUTION INTRAVENOUS at 13:40

## 2018-06-14 RX ADMIN — METRONIDAZOLE SCH MLS/HR: 500 INJECTION, SOLUTION INTRAVENOUS at 21:19

## 2018-06-14 RX ADMIN — OXYCODONE HYDROCHLORIDE AND ACETAMINOPHEN PRN TAB: 5; 325 TABLET ORAL at 21:25

## 2018-06-14 RX ADMIN — METRONIDAZOLE SCH MLS/HR: 500 INJECTION, SOLUTION INTRAVENOUS at 05:33

## 2018-06-14 RX ADMIN — VANCOMYCIN HYDROCHLORIDE SCH MG: 500 INJECTION, POWDER, LYOPHILIZED, FOR SOLUTION INTRAVENOUS at 10:53

## 2018-06-14 RX ADMIN — NYSTATIN SCH: 100000 SUSPENSION ORAL at 11:01

## 2018-06-14 RX ADMIN — NYSTATIN SCH: 100000 SUSPENSION ORAL at 17:33

## 2018-06-14 RX ADMIN — NYSTATIN SCH ML: 100000 SUSPENSION ORAL at 21:19

## 2018-06-14 RX ADMIN — VANCOMYCIN HYDROCHLORIDE SCH MG: 500 INJECTION, POWDER, LYOPHILIZED, FOR SOLUTION INTRAVENOUS at 13:40

## 2018-06-14 RX ADMIN — VANCOMYCIN HYDROCHLORIDE SCH: 500 INJECTION, POWDER, LYOPHILIZED, FOR SOLUTION INTRAVENOUS at 18:56

## 2018-06-14 RX ADMIN — NYSTATIN SCH ML: 100000 SUSPENSION ORAL at 10:52

## 2018-06-14 RX ADMIN — NYSTATIN SCH: 100000 SUSPENSION ORAL at 14:04

## 2018-06-14 RX ADMIN — VANCOMYCIN HYDROCHLORIDE SCH MG: 500 INJECTION, POWDER, LYOPHILIZED, FOR SOLUTION INTRAVENOUS at 21:19

## 2018-06-14 RX ADMIN — OXYCODONE HYDROCHLORIDE AND ACETAMINOPHEN PRN TAB: 5; 325 TABLET ORAL at 08:29

## 2018-06-14 RX ADMIN — SODIUM CHLORIDE SCH MLS/HR: 0.9 INJECTION, SOLUTION INTRAVENOUS at 10:52

## 2018-06-14 RX ADMIN — Medication SCH MG: at 10:52

## 2018-06-14 RX ADMIN — BUDESONIDE SCH: 0.5 SUSPENSION RESPIRATORY (INHALATION) at 20:03

## 2018-06-14 RX ADMIN — BUDESONIDE SCH MG: 0.5 SUSPENSION RESPIRATORY (INHALATION) at 09:10

## 2018-06-14 NOTE — CP.PCM.PN
<Ade Caballero - Last Filed: 06/14/18 11:09>





Subjective





- Date & Time of Evaluation


Date of Evaluation: 06/14/18


Time of Evaluation: 11:07





- Subjective


Subjective: 


S&E at bedside, chart reviewed, no diarrhea, or overt GI bleeding. Patient 

eating better. No acute overnight events. No N/V or abdominal pain.





Objective





- Vital Signs/Intake and Output


Vital Signs (last 24 hours): 


 











Temp Pulse Resp BP Pulse Ox


 


 98.1 F   100 H  20   102/62   100 


 


 06/14/18 06:00  06/14/18 06:00  06/14/18 06:00  06/14/18 06:00  06/14/18 06:00








Intake and Output: 


 











 06/14/18 06/14/18





 06:59 18:59


 


Intake Total 550 


 


Output Total 150 


 


Balance 400 














- Medications


Medications: 


 Current Medications





Albuterol/Ipratropium (Duoneb 3 Mg/0.5 Mg (3 Ml) Ud)  3 ml IH [id number] PRN


   PRN Reason: Shortness of Breath


   Last Admin: 06/12/18 09:47 Dose:  3 ml


Arformoterol Tartrate (Brovana)  15 mcg IH [id number] [facility]


   Last Admin: 06/10/18 08:01 Dose:  Not Given


Budesonide (Pulmicort Respules)  0.5 mg IH [id number] [facility]


   Last Admin: 06/14/18 09:10 Dose:  0.5 mg


Al Hydrox/Mg Hydrox/Simethicone 30 ml/Diphenhydramine HCl 75 mg/Lidocaine 30 ml

  0 ml PO Q2H PRN


   PRN Reason: Mouth/Throat Pain


   Last Admin: 06/07/18 04:04 Dose:  30 ml


Metronidazole (Flagyl)  500 mg in 100 mls @ 100 mls/hr IVPB Q8 [facility]


   PRN Reason: Protocol


   Last Admin: 06/14/18 05:33 Dose:  100 mls/hr


Potassium Phosphate 15 mmole/ (Sodium Chloride)  255 mls @ 42.5 mls/hr IVPB 

DAILY [facility]


   Last Admin: 06/14/18 10:52 Dose:  42.5 mls/hr


Lidocaine HCl (Lidocaine 2% Viscous)  15 ml MM Q3H PRN


   PRN Reason: Other


   Last Admin: 06/08/18 09:06 Dose:  15 ml


Magnesium Oxide (Mag-Ox)  400 mg PO DAILY [facility]


   Last Admin: 06/14/18 10:52 Dose:  400 mg


Nystatin (Nystatin Oral Susp)  5 ml PO QID [facility]


   Last Admin: 06/14/18 11:01 Dose:  Not Given


Oxycodone/Acetaminophen (Percocet 5/325 Mg Tab)  1 tab PO Q6H PRN


   PRN Reason: Pain, moderate (4-7)


   Stop: 06/16/18 09:31


   Last Admin: 06/14/18 08:29 Dose:  1 tab


Pantoprazole Sodium (Protonix Inj)  40 mg IVP DAILY [facility]


   Last Admin: 06/14/18 10:52 Dose:  40 mg


Ursodiol (Actigall)  300 mg PO BID [facility]


   Last Admin: 06/14/18 10:52 Dose:  300 mg


Vancomycin HCl (Vancocin 25 Mg/Ml (Oral Use))  500 mg PO QID [facility]


   PRN Reason: Protocol


   Last Admin: 06/14/18 10:53 Dose:  500 mg











- Labs


Labs: 


 





 06/14/18 06:10 





 06/14/18 06:10 





 











PT  21.5 SECONDS (9.4-12.5)  H  06/11/18  05:20    


 


INR  1.85  (0.93-1.08)  H  06/11/18  05:20    


 


APTT  30.5 Seconds (25.1-36.5)   06/08/18  09:00    














- Constitutional


Appears: No Acute Distress





- Eye Exam


Eye Exam: Scleral icterus





- ENT Exam


ENT Exam: Mucous Membranes Moist





- Neck Exam


Neck Exam: Normal Inspection





- Respiratory Exam


Respiratory Exam: NORMAL BREATHING PATTERN.  absent: Respiratory Distress





- Cardiovascular Exam


Cardiovascular Exam: +S1, +S2





- GI/Abdominal Exam


GI & Abdominal Exam: Soft, Normal Bowel Sounds.  absent: Guarding, Tenderness, 

Rebound





- Extremities Exam


Extremities Exam: absent: Calf Tenderness





- Neurological Exam


Neurological Exam: Alert, Awake, Oriented x3





Assessment and Plan





- Assessment and Plan (Free Text)


Assessment: 





Assessment: 





Cdiff Positive


Improved Odynphagia, oral thrush


Sepsis with neutropenic fever secondary to pancolitis 


Pancytopenia 


Cholangiocarcinoma on chemotherapy 


Primary sclerosing cholangitis 


Cirrhosis s/p ERCP with biliary stent 


Ulcerative colitis


COPD


Giant cell arteritis  





Plan:


 


On IV antibiotics as per ID


on oral Vancomycin


on Nystatin


On Actigall


continue to monitor LFTs and CBC


diet as tolerated


as per oncology





Seen and discussed with Dr. Valencia. 





<Nick Valencia - Last Filed: 06/15/18 00:59>





Objective





- Vital Signs/Intake and Output


Vital Signs (last 24 hours): 


 











Temp Pulse Resp BP Pulse Ox


 


 97.8 F   105 H  97 H  108/70   18 L


 


 06/14/18 22:00  06/14/18 22:00  06/14/18 22:00  06/14/18 22:00  06/14/18 22:00








Intake and Output: 


 











 06/14/18 06/15/18





 18:59 06:59


 


Intake Total 240 240


 


Output Total 200 200


 


Balance 40 40














- Medications


Medications: 


 Current Medications





Albuterol/Ipratropium (Duoneb 3 Mg/0.5 Mg (3 Ml) Ud)  3 ml IH [id number] PRN


   PRN Reason: Shortness of Breath


   Last Admin: 06/12/18 09:47 Dose:  3 ml


Arformoterol Tartrate (Brovana)  15 mcg IH [id number] [facility]


   Last Admin: 06/10/18 08:01 Dose:  Not Given


Budesonide (Pulmicort Respules)  0.5 mg IH [id number] [facility]


   Last Admin: 06/14/18 20:03 Dose:  Not Given


Al Hydrox/Mg Hydrox/Simethicone 30 ml/Diphenhydramine HCl 75 mg/Lidocaine 30 ml

  0 ml PO Q2H PRN


   PRN Reason: Mouth/Throat Pain


   Last Admin: 06/07/18 04:04 Dose:  30 ml


Metronidazole (Flagyl)  500 mg in 100 mls @ 100 mls/hr IVPB Q8 [facility]


   PRN Reason: Protocol


   Last Admin: 06/14/18 21:19 Dose:  100 mls/hr


Potassium Phosphate 15 mmole/ (Sodium Chloride)  255 mls @ 42.5 mls/hr IVPB 

DAILY [facility]


   Last Admin: 06/14/18 10:52 Dose:  42.5 mls/hr


Lidocaine HCl (Lidocaine 2% Viscous)  15 ml MM Q3H PRN


   PRN Reason: Other


   Last Admin: 06/08/18 09:06 Dose:  15 ml


Magnesium Oxide (Mag-Ox)  400 mg PO DAILY [facility]


   Last Admin: 06/14/18 10:52 Dose:  400 mg


Nystatin (Nystatin Oral Susp)  5 ml PO QID [facility]


   Last Admin: 06/14/18 23:29 Dose:  Not Given


Ondansetron HCl (Zofran Odt)  4 mg PO Q8H PRN


   PRN Reason: Nausea/Vomiting


Oxycodone/Acetaminophen (Percocet 5/325 Mg Tab)  1 tab PO Q6H PRN


   PRN Reason: Pain, moderate (4-7)


   Stop: 06/16/18 09:31


   Last Admin: 06/14/18 21:25 Dose:  1 tab


Pantoprazole Sodium (Protonix Inj)  40 mg IVP DAILY [facility]


   Last Admin: 06/14/18 10:52 Dose:  40 mg


Ursodiol (Actigall)  300 mg PO BID [facility]


   Last Admin: 06/14/18 18:56 Dose:  Not Given


Vancomycin HCl (Vancocin 25 Mg/Ml (Oral Use))  500 mg PO QID [facility]


   PRN Reason: Protocol


   Last Admin: 06/14/18 21:19 Dose:  500 mg











- Labs


Labs: 


 





 06/14/18 06:10 





 06/14/18 06:10 





 











PT  21.5 SECONDS (9.4-12.5)  H  06/11/18  05:20    


 


INR  1.85  (0.93-1.08)  H  06/11/18  05:20    


 


APTT  30.5 Seconds (25.1-36.5)   06/08/18  09:00    














Attending/Attestation





- Attestation


I have personally seen and examined this patient.: Yes


I have fully participated in the care of the patient.: Yes


I have reviewed all pertinent clinical information, including history, physical 

exam and plan: Yes


Notes (Text): 


This is an addendum to GI progress report dictated by DIANA Oliveira.The 

patient was seen and examined earlier.  Medical records, lab studies, imagings 

were reviewed.  Last 24 hours events reviewed.  Agreed with the above treatment 

plan as outlined in  DIANA Oliveira's notes the with the addition of the 

following


on examination abdomen soft no tenderness


Follow-up LFTs continue antibiotics as per ID


Continue Actigall





06/15/18 00:53

## 2018-06-14 NOTE — CP.PCM.PN
Subjective





- Date & Time of Evaluation


Date of Evaluation: 06/14/18


Time of Evaluation: 09:30





- Subjective


Subjective: 





c/o back pain, otherwise NAD





Objective





- Vital Signs/Intake and Output


Vital Signs (last 24 hours): 


 











Temp Pulse Resp BP Pulse Ox


 


 98.1 F   100 H  20   102/62   100 


 


 06/14/18 06:00  06/14/18 06:00  06/14/18 06:00  06/14/18 06:00  06/14/18 06:00








Intake and Output: 


 











 06/14/18 06/14/18





 06:59 18:59


 


Intake Total 550 


 


Output Total 150 


 


Balance 400 














- Medications


Medications: 


 Current Medications





Albuterol/Ipratropium (Duoneb 3 Mg/0.5 Mg (3 Ml) Ud)  3 ml IH [id number] PRN


   PRN Reason: Shortness of Breath


   Last Admin: 06/12/18 09:47 Dose:  3 ml


Arformoterol Tartrate (Brovana)  15 mcg IH [id number] [facility]


   Last Admin: 06/10/18 08:01 Dose:  Not Given


Budesonide (Pulmicort Respules)  0.5 mg IH [id number] [facility]


   Last Admin: 06/14/18 09:10 Dose:  0.5 mg


Al Hydrox/Mg Hydrox/Simethicone 30 ml/Diphenhydramine HCl 75 mg/Lidocaine 30 ml

  0 ml PO Q2H PRN


   PRN Reason: Mouth/Throat Pain


   Last Admin: 06/07/18 04:04 Dose:  30 ml


Metronidazole (Flagyl)  500 mg in 100 mls @ 100 mls/hr IVPB Q8 [facility]


   PRN Reason: Protocol


   Last Admin: 06/14/18 05:33 Dose:  100 mls/hr


Potassium Phosphate 15 mmole/ (Sodium Chloride)  255 mls @ 42.5 mls/hr IVPB 

DAILY [facility]


   Last Admin: 06/14/18 10:52 Dose:  42.5 mls/hr


Lidocaine HCl (Lidocaine 2% Viscous)  15 ml MM Q3H PRN


   PRN Reason: Other


   Last Admin: 06/08/18 09:06 Dose:  15 ml


Magnesium Oxide (Mag-Ox)  400 mg PO DAILY [facility]


   Last Admin: 06/14/18 10:52 Dose:  400 mg


Nystatin (Nystatin Oral Susp)  5 ml PO QID [facility]


   Last Admin: 06/14/18 11:01 Dose:  Not Given


Oxycodone/Acetaminophen (Percocet 5/325 Mg Tab)  1 tab PO Q6H PRN


   PRN Reason: Pain, moderate (4-7)


   Stop: 06/16/18 09:31


   Last Admin: 06/14/18 08:29 Dose:  1 tab


Pantoprazole Sodium (Protonix Inj)  40 mg IVP DAILY [facility]


   Last Admin: 06/14/18 10:52 Dose:  40 mg


Ursodiol (Actigall)  300 mg PO BID [facility]


   Last Admin: 06/14/18 10:52 Dose:  300 mg


Vancomycin HCl (Vancocin 25 Mg/Ml (Oral Use))  500 mg PO QID [facility]


   PRN Reason: Protocol


   Last Admin: 06/14/18 10:53 Dose:  500 mg











- Labs


Labs: 


 





 06/14/18 06:10 





 06/14/18 06:10 





 











PT  21.5 SECONDS (9.4-12.5)  H  06/11/18  05:20    


 


INR  1.85  (0.93-1.08)  H  06/11/18  05:20    


 


APTT  30.5 Seconds (25.1-36.5)   06/08/18  09:00    














- Respiratory Exam


Respiratory Exam: Clear to Ausculation Bilateral, NORMAL BREATHING PATTERN





- Cardiovascular Exam


Cardiovascular Exam: REGULAR RHYTHM





- GI/Abdominal Exam


GI & Abdominal Exam: Soft, Normal Bowel Sounds





- Extremities Exam


Additional comments: 





generalized anasarca





- Neurological Exam


Neurological Exam: Alert, Awake





- Skin


Skin Exam: Dry, Warm





Assessment and Plan


(1) Cholangiocarcinoma


Status: Acute   





(2) Neutropenic fever


Status: Acute   





(3) Abdominal pain


Status: Acute   





(4) Hypokalemia


Status: Acute   





(5) Clostridium difficile colitis


Status: Acute   





(6) Anasarca


Status: Chronic   





- Assessment and Plan (Free Text)


Plan: 





continue supportive care, monitor CBC/lytes, SW for disposition, prognosis 

remains terminal

## 2018-06-14 NOTE — CP.PCM.PN
Subjective





- Date & Time of Evaluation


Date of Evaluation: 06/14/18


Time of Evaluation: 11:05





- Subjective


Subjective: 





Diarrhea is a little bit improved, no fevers, not in distress but still feels 

weak.





Objective





- Vital Signs/Intake and Output


Vital Signs (last 24 hours): 


 











Temp Pulse Resp BP Pulse Ox


 


 98.1 F   100 H  20   102/62   100 


 


 06/14/18 06:00  06/14/18 06:00  06/14/18 06:00  06/14/18 06:00  06/14/18 06:00








Intake and Output: 


 











 06/14/18 06/14/18





 06:59 18:59


 


Intake Total 550 


 


Output Total 150 


 


Balance 400 














- Medications


Medications: 


 Current Medications





Albuterol/Ipratropium (Duoneb 3 Mg/0.5 Mg (3 Ml) Ud)  3 ml IH [id number] PRN


   PRN Reason: Shortness of Breath


   Last Admin: 06/12/18 09:47 Dose:  3 ml


Arformoterol Tartrate (Brovana)  15 mcg IH [id number] [facility]


   Last Admin: 06/10/18 08:01 Dose:  Not Given


Budesonide (Pulmicort Respules)  0.5 mg IH [id number] [facility]


   Last Admin: 06/14/18 09:10 Dose:  0.5 mg


Al Hydrox/Mg Hydrox/Simethicone 30 ml/Diphenhydramine HCl 75 mg/Lidocaine 30 ml

  0 ml PO Q2H PRN


   PRN Reason: Mouth/Throat Pain


   Last Admin: 06/07/18 04:04 Dose:  30 ml


Metronidazole (Flagyl)  500 mg in 100 mls @ 100 mls/hr IVPB Q8 [facility]


   PRN Reason: Protocol


   Last Admin: 06/14/18 05:33 Dose:  100 mls/hr


Potassium Phosphate 15 mmole/ (Sodium Chloride)  255 mls @ 42.5 mls/hr IVPB 

DAILY [facility]


   Last Admin: 06/13/18 10:31 Dose:  42.5 mls/hr


Lidocaine HCl (Lidocaine 2% Viscous)  15 ml MM Q3H PRN


   PRN Reason: Other


   Last Admin: 06/08/18 09:06 Dose:  15 ml


Magnesium Oxide (Mag-Ox)  400 mg PO DAILY [facility]


   Last Admin: 06/13/18 10:30 Dose:  400 mg


Nystatin (Nystatin Oral Susp)  5 ml PO QID [facility]


   Last Admin: 06/13/18 22:33 Dose:  5 ml


Oxycodone/Acetaminophen (Percocet 5/325 Mg Tab)  1 tab PO Q6H PRN


   PRN Reason: Pain, moderate (4-7)


   Stop: 06/16/18 09:31


   Last Admin: 06/14/18 08:29 Dose:  1 tab


Pantoprazole Sodium (Protonix Inj)  40 mg IVP DAILY [facility]


   Last Admin: 06/13/18 10:30 Dose:  40 mg


Ursodiol (Actigall)  300 mg PO BID [facility]


   Last Admin: 06/13/18 19:33 Dose:  300 mg


Vancomycin HCl (Vancocin 25 Mg/Ml (Oral Use))  500 mg PO QID [facility]


   PRN Reason: Protocol


   Last Admin: 06/13/18 22:34 Dose:  500 mg











- Labs


Labs: 


 





 06/14/18 06:10 





 06/14/18 06:10 





 











PT  21.5 SECONDS (9.4-12.5)  H  06/11/18  05:20    


 


INR  1.85  (0.93-1.08)  H  06/11/18  05:20    


 


APTT  30.5 Seconds (25.1-36.5)   06/08/18  09:00    














- Constitutional


Appears: Cachectic, Chronically Ill





- Head Exam


Head Exam: NORMAL INSPECTION





- Respiratory Exam


Respiratory Exam: Decreased Breath Sounds





- Cardiovascular Exam


Cardiovascular Exam: +S1, +S2





- GI/Abdominal Exam


GI & Abdominal Exam: Soft.  absent: Tenderness





Assessment and Plan





- Assessment and Plan (Free Text)


Plan: 








Assessment


Severe Sepsis with febrile neutropenia, due to pancolitis, with c. diff.


history of sepsis from biliary tree as the source in a patient with primary 

sclerosing cholangitis with liver cirrhosis S/P ERCP and biliary stent 

placement - S/P biopsy of the enlarged lymph nodes adjacent to the common bile 

duct


cholangiocarcinoma on chemotherapy


S/P cholecystectomy


Ulcerative colitis


history of MSSA sinusitis


COPD


allergic rhinitis


giant cell arteritis





Plan


stool for C. diff is positive and will continue PO Vancomycin and IV Flagyl day 

8 to complete 10-14 days 


will continue monitor clinically


overall prognosis is poor

## 2018-06-15 VITALS
RESPIRATION RATE: 20 BRPM | OXYGEN SATURATION: 93 % | SYSTOLIC BLOOD PRESSURE: 100 MMHG | TEMPERATURE: 99 F | HEART RATE: 104 BPM | DIASTOLIC BLOOD PRESSURE: 61 MMHG

## 2018-06-15 RX ADMIN — BUDESONIDE SCH: 0.5 SUSPENSION RESPIRATORY (INHALATION) at 07:23

## 2018-06-15 RX ADMIN — ARFORMOTEROL TARTRATE SCH: 15 SOLUTION RESPIRATORY (INHALATION) at 07:44

## 2018-06-15 RX ADMIN — VANCOMYCIN HYDROCHLORIDE SCH MG: 500 INJECTION, POWDER, LYOPHILIZED, FOR SOLUTION INTRAVENOUS at 10:31

## 2018-06-15 RX ADMIN — VANCOMYCIN HYDROCHLORIDE SCH MG: 500 INJECTION, POWDER, LYOPHILIZED, FOR SOLUTION INTRAVENOUS at 14:32

## 2018-06-15 RX ADMIN — VANCOMYCIN HYDROCHLORIDE SCH MG: 500 INJECTION, POWDER, LYOPHILIZED, FOR SOLUTION INTRAVENOUS at 17:55

## 2018-06-15 RX ADMIN — METRONIDAZOLE SCH MLS/HR: 500 INJECTION, SOLUTION INTRAVENOUS at 05:21

## 2018-06-15 RX ADMIN — NYSTATIN SCH: 100000 SUSPENSION ORAL at 18:05

## 2018-06-15 RX ADMIN — SODIUM CHLORIDE SCH MLS/HR: 0.9 INJECTION, SOLUTION INTRAVENOUS at 10:30

## 2018-06-15 RX ADMIN — NYSTATIN SCH ML: 100000 SUSPENSION ORAL at 10:29

## 2018-06-15 RX ADMIN — OXYCODONE HYDROCHLORIDE AND ACETAMINOPHEN PRN TAB: 5; 325 TABLET ORAL at 17:56

## 2018-06-15 RX ADMIN — NYSTATIN SCH: 100000 SUSPENSION ORAL at 12:05

## 2018-06-15 RX ADMIN — Medication SCH MG: at 10:29

## 2018-06-15 NOTE — CP.PCM.PN
Subjective





- Date & Time of Evaluation


Date of Evaluation: 06/15/18


Time of Evaluation: 10:50





- Subjective


Subjective: 





S&E at bedside, chart reviewed, no acute overnight events or new complaints. No 

diarrhea, can't remember last Bm, does get nausea times but still able to eat 

and reports better appetite, no difficulty swallowing, much improved.





Objective





- Vital Signs/Intake and Output


Vital Signs (last 24 hours): 


 











Temp Pulse Resp BP Pulse Ox


 


 98 F   103 H  18   116/74   97 


 


 06/15/18 06:00  06/15/18 06:00  06/15/18 06:00  06/15/18 06:00  06/15/18 06:00








Intake and Output: 


 











 06/15/18 06/15/18





 06:59 18:59


 


Intake Total 910 


 


Output Total 400 


 


Balance 510 














- Medications


Medications: 


 Current Medications





Albuterol/Ipratropium (Duoneb 3 Mg/0.5 Mg (3 Ml) Ud)  3 ml IH [id number] PRN


   PRN Reason: Shortness of Breath


   Last Admin: 06/12/18 09:47 Dose:  3 ml


Arformoterol Tartrate (Brovana)  15 mcg IH [id number] [facility]


   Last Admin: 06/15/18 07:44 Dose:  Not Given


Budesonide (Pulmicort Respules)  0.5 mg IH [id number] [facility]


   Last Admin: 06/15/18 07:23 Dose:  Not Given


Al Hydrox/Mg Hydrox/Simethicone 30 ml/Diphenhydramine HCl 75 mg/Lidocaine 30 ml

  0 ml PO Q2H PRN


   PRN Reason: Mouth/Throat Pain


   Last Admin: 06/07/18 04:04 Dose:  30 ml


Metronidazole (Flagyl)  500 mg in 100 mls @ 100 mls/hr IVPB Q8 [facility]


   PRN Reason: Protocol


   Last Admin: 06/15/18 05:21 Dose:  100 mls/hr


Potassium Phosphate 15 mmole/ (Sodium Chloride)  255 mls @ 42.5 mls/hr IVPB 

DAILY [facility]


   Last Admin: 06/15/18 10:30 Dose:  42.5 mls/hr


Lidocaine HCl (Lidocaine 2% Viscous)  15 ml MM Q3H PRN


   PRN Reason: Other


   Last Admin: 06/08/18 09:06 Dose:  15 ml


Magnesium Oxide (Mag-Ox)  400 mg PO DAILY [facility]


   Last Admin: 06/15/18 10:29 Dose:  400 mg


Nystatin (Nystatin Oral Susp)  5 ml PO QID [facility]


   Last Admin: 06/15/18 10:29 Dose:  5 ml


Ondansetron HCl (Zofran Odt)  4 mg PO Q8H PRN


   PRN Reason: Nausea/Vomiting


Oxycodone/Acetaminophen (Percocet 5/325 Mg Tab)  1 tab PO Q6H PRN


   PRN Reason: Pain, moderate (4-7)


   Stop: 06/16/18 09:31


   Last Admin: 06/14/18 21:25 Dose:  1 tab


Pantoprazole Sodium (Protonix Inj)  40 mg IVP DAILY [facility]


   Last Admin: 06/15/18 10:30 Dose:  40 mg


Ursodiol (Actigall)  300 mg PO BID [facility]


   Last Admin: 06/15/18 10:29 Dose:  300 mg


Vancomycin HCl (Vancocin 25 Mg/Ml (Oral Use))  500 mg PO QID [facility]


   PRN Reason: Protocol


   Last Admin: 06/15/18 10:31 Dose:  500 mg











- Labs


Labs: 


 





 06/14/18 06:10 





 06/14/18 06:10 





 











PT  21.5 SECONDS (9.4-12.5)  H  06/11/18  05:20    


 


INR  1.85  (0.93-1.08)  H  06/11/18  05:20    


 


APTT  30.5 Seconds (25.1-36.5)   06/08/18  09:00    














- Constitutional


Appears: No Acute Distress





- Eye Exam


Eye Exam: Scleral icterus





- ENT Exam


ENT Exam: Mucous Membranes Moist





- Respiratory Exam


Respiratory Exam: NORMAL BREATHING PATTERN.  absent: Respiratory Distress





- Cardiovascular Exam


Cardiovascular Exam: +S1, +S2





- GI/Abdominal Exam


GI & Abdominal Exam: Soft, Normal Bowel Sounds.  absent: Guarding, Tenderness, 

Rebound





- Extremities Exam


Extremities Exam: Pedal Edema.  absent: Calf Tenderness





- Neurological Exam


Neurological Exam: Alert, Awake, Oriented x3





- Skin


Skin Exam: Dry, Warm





Assessment and Plan





- Assessment and Plan (Free Text)


Assessment: 





Assessment: 





Cdiff Positive, resolving


Improved Odynphagia, oral thrush


Sepsis with neutropenic fever secondary to pancolitis 


Pancytopenia 


Cholangiocarcinoma on chemotherapy 


Primary sclerosing cholangitis 


Cirrhosis s/p ERCP with biliary stent 


Ulcerative colitis


COPD


Giant cell arteritis  





Plan:


 


On IV antibiotics Flagyl


on oral Vancomycin


on Nystatin


On Actigall


Zofran prn


continue to monitor LFTs and CBC


diet as tolerated


as per oncology





Seen and discussed with Dr. Valencia.

## 2018-06-15 NOTE — CP.PCM.PN
Subjective





- Date & Time of Evaluation


Date of Evaluation: 06/15/18


Time of Evaluation: 11:45





- Subjective


Subjective: 





Patient has no more diarrhea, no fevers, not in distress. Still feels weak.





Objective





- Vital Signs/Intake and Output


Vital Signs (last 24 hours): 


 











Temp Pulse Resp BP Pulse Ox


 


 98 F   103 H  18   116/74   97 


 


 06/15/18 06:00  06/15/18 06:00  06/15/18 06:00  06/15/18 06:00  06/15/18 06:00








Intake and Output: 


 











 06/15/18 06/15/18





 06:59 18:59


 


Intake Total 910 


 


Output Total 400 


 


Balance 510 














- Medications


Medications: 


 Current Medications





Albuterol/Ipratropium (Duoneb 3 Mg/0.5 Mg (3 Ml) Ud)  3 ml IH [id number] PRN


   PRN Reason: Shortness of Breath


   Last Admin: 06/12/18 09:47 Dose:  3 ml


Arformoterol Tartrate (Brovana)  15 mcg IH [id number] [facility]


   Last Admin: 06/15/18 07:44 Dose:  Not Given


Budesonide (Pulmicort Respules)  0.5 mg IH [id number] [facility]


   Last Admin: 06/15/18 07:23 Dose:  Not Given


Al Hydrox/Mg Hydrox/Simethicone 30 ml/Diphenhydramine HCl 75 mg/Lidocaine 30 ml

  0 ml PO Q2H PRN


   PRN Reason: Mouth/Throat Pain


   Last Admin: 06/07/18 04:04 Dose:  30 ml


Metronidazole (Flagyl)  500 mg in 100 mls @ 100 mls/hr IVPB Q8 [facility]


   PRN Reason: Protocol


   Last Admin: 06/15/18 05:21 Dose:  100 mls/hr


Potassium Phosphate 15 mmole/ (Sodium Chloride)  255 mls @ 42.5 mls/hr IVPB 

DAILY [facility]


   Last Admin: 06/14/18 10:52 Dose:  42.5 mls/hr


Lidocaine HCl (Lidocaine 2% Viscous)  15 ml MM Q3H PRN


   PRN Reason: Other


   Last Admin: 06/08/18 09:06 Dose:  15 ml


Magnesium Oxide (Mag-Ox)  400 mg PO DAILY [facility]


   Last Admin: 06/14/18 10:52 Dose:  400 mg


Nystatin (Nystatin Oral Susp)  5 ml PO QID [facility]


   Last Admin: 06/14/18 23:29 Dose:  Not Given


Ondansetron HCl (Zofran Odt)  4 mg PO Q8H PRN


   PRN Reason: Nausea/Vomiting


Oxycodone/Acetaminophen (Percocet 5/325 Mg Tab)  1 tab PO Q6H PRN


   PRN Reason: Pain, moderate (4-7)


   Stop: 06/16/18 09:31


   Last Admin: 06/14/18 21:25 Dose:  1 tab


Pantoprazole Sodium (Protonix Inj)  40 mg IVP DAILY [facility]


   Last Admin: 06/14/18 10:52 Dose:  40 mg


Ursodiol (Actigall)  300 mg PO BID [facility]


   Last Admin: 06/14/18 18:56 Dose:  Not Given


Vancomycin HCl (Vancocin 25 Mg/Ml (Oral Use))  500 mg PO QID [facility]


   PRN Reason: Protocol


   Last Admin: 06/14/18 21:19 Dose:  500 mg











- Labs


Labs: 


 





 06/14/18 06:10 





 06/14/18 06:10 





 











PT  21.5 SECONDS (9.4-12.5)  H  06/11/18  05:20    


 


INR  1.85  (0.93-1.08)  H  06/11/18  05:20    


 


APTT  30.5 Seconds (25.1-36.5)   06/08/18  09:00    














- Constitutional


Appears: Cachectic, Chronically Ill





- Head Exam


Head Exam: NORMAL INSPECTION





- ENT Exam


ENT Exam: Mucous Membranes Moist





- Neck Exam


Neck Exam: absent: Meningismus





- Respiratory Exam


Respiratory Exam: Decreased Breath Sounds


Additional comments: 





right sided anterior chest wall port in place





- Cardiovascular Exam


Cardiovascular Exam: +S1, +S2





- GI/Abdominal Exam


GI & Abdominal Exam: Soft.  absent: Tenderness





Assessment and Plan





- Assessment and Plan (Free Text)


Plan: 





Assessment


Severe Sepsis with febrile neutropenia, due to pancolitis, with c. diff., 

clinically improving


history of sepsis from biliary tree as the source in a patient with primary 

sclerosing cholangitis with liver cirrhosis S/P ERCP and biliary stent 

placement - S/P biopsy of the enlarged lymph nodes adjacent to the common bile 

duct


cholangiocarcinoma on chemotherapy


S/P cholecystectomy


Ulcerative colitis


history of MSSA sinusitis


COPD


allergic rhinitis


giant cell arteritis





Plan


stool for C. diff is positive and will continue PO Vancomycin day 9 - since she 

has improved, we can d/c - should complete 10-14 days 


will continue monitor clinically


overall prognosis is poor

## 2018-06-15 NOTE — CP.PCM.PN
Subjective





- Date & Time of Evaluation


Date of Evaluation: 06/15/18


Time of Evaluation: 10:20





- Subjective


Subjective: 





resting comfortably, NAD





Objective





- Vital Signs/Intake and Output


Vital Signs (last 24 hours): 


 











Temp Pulse Resp BP Pulse Ox


 


 98 F   103 H  18   116/74   97 


 


 06/15/18 06:00  06/15/18 06:00  06/15/18 06:00  06/15/18 06:00  06/15/18 06:00








Intake and Output: 


 











 06/15/18 06/15/18





 06:59 18:59


 


Intake Total 910 


 


Output Total 400 


 


Balance 510 














- Medications


Medications: 


 Current Medications





Albuterol/Ipratropium (Duoneb 3 Mg/0.5 Mg (3 Ml) Ud)  3 ml IH [id number] PRN


   PRN Reason: Shortness of Breath


   Last Admin: 06/12/18 09:47 Dose:  3 ml


Arformoterol Tartrate (Brovana)  15 mcg IH [id number] [facility]


   Last Admin: 06/15/18 07:44 Dose:  Not Given


Budesonide (Pulmicort Respules)  0.5 mg IH [id number] [facility]


   Last Admin: 06/15/18 07:23 Dose:  Not Given


Al Hydrox/Mg Hydrox/Simethicone 30 ml/Diphenhydramine HCl 75 mg/Lidocaine 30 ml

  0 ml PO Q2H PRN


   PRN Reason: Mouth/Throat Pain


   Last Admin: 06/07/18 04:04 Dose:  30 ml


Metronidazole (Flagyl)  500 mg in 100 mls @ 100 mls/hr IVPB Q8 [facility]


   PRN Reason: Protocol


   Last Admin: 06/15/18 05:21 Dose:  100 mls/hr


Potassium Phosphate 15 mmole/ (Sodium Chloride)  255 mls @ 42.5 mls/hr IVPB 

DAILY [facility]


   Last Admin: 06/14/18 10:52 Dose:  42.5 mls/hr


Lidocaine HCl (Lidocaine 2% Viscous)  15 ml MM Q3H PRN


   PRN Reason: Other


   Last Admin: 06/08/18 09:06 Dose:  15 ml


Magnesium Oxide (Mag-Ox)  400 mg PO DAILY [facility]


   Last Admin: 06/14/18 10:52 Dose:  400 mg


Nystatin (Nystatin Oral Susp)  5 ml PO QID [facility]


   Last Admin: 06/14/18 23:29 Dose:  Not Given


Ondansetron HCl (Zofran Odt)  4 mg PO Q8H PRN


   PRN Reason: Nausea/Vomiting


Oxycodone/Acetaminophen (Percocet 5/325 Mg Tab)  1 tab PO Q6H PRN


   PRN Reason: Pain, moderate (4-7)


   Stop: 06/16/18 09:31


   Last Admin: 06/14/18 21:25 Dose:  1 tab


Pantoprazole Sodium (Protonix Inj)  40 mg IVP DAILY [facility]


   Last Admin: 06/14/18 10:52 Dose:  40 mg


Ursodiol (Actigall)  300 mg PO BID [facility]


   Last Admin: 06/14/18 18:56 Dose:  Not Given


Vancomycin HCl (Vancocin 25 Mg/Ml (Oral Use))  500 mg PO QID [facility]


   PRN Reason: Protocol


   Last Admin: 06/14/18 21:19 Dose:  500 mg











- Labs


Labs: 


 





 06/14/18 06:10 





 06/14/18 06:10 





 











PT  21.5 SECONDS (9.4-12.5)  H  06/11/18  05:20    


 


INR  1.85  (0.93-1.08)  H  06/11/18  05:20    


 


APTT  30.5 Seconds (25.1-36.5)   06/08/18  09:00    














- Respiratory Exam


Respiratory Exam: Clear to Ausculation Bilateral, NORMAL BREATHING PATTERN





- Cardiovascular Exam


Cardiovascular Exam: REGULAR RHYTHM





- GI/Abdominal Exam


GI & Abdominal Exam: Soft, Normal Bowel Sounds





- Extremities Exam


Extremities Exam: Pedal Edema





- Neurological Exam


Neurological Exam: Alert, Awake





- Skin


Skin Exam: Dry, Warm





Assessment and Plan


(1) Cholangiocarcinoma


Status: Acute   





(2) Neutropenic fever


Status: Acute   





(3) Abdominal pain


Status: Acute   





(4) Hypokalemia


Status: Acute   





(5) Clostridium difficile colitis


Status: Acute   





(6) Anasarca


Status: Chronic   





- Assessment and Plan (Free Text)


Plan: 





continue supportive care, monitor lytes/CBC

## 2018-06-17 NOTE — DS
HOSPITAL COURSE:  The patient is a 73-year-old female admitted to the

intensive care unit on 06/06/2018 with severe pancytopenia and presumed

sepsis.  The patient was empirically started on IV antibiotics, was seen in

consultation by infectious disease as well as by hematology/oncology.  The

patient has past medical history of metastatic cholangiocarcinoma.  She

received intravenous Neupogen for her neutropenia.  She had an uneventful

hospital course with improvement.  Her hospital course was complicated by

Clostridium difficile colitis, for which she received oral vancomycin and

became medically stable for discharge to hospice care on 06/15/2018. 

Discharged to [facility] for hospice care.



PHYSICAL EXAMINATION:

VITAL SIGNS:  Vital signs are as per progress note of 06/15/2018.



IMPRESSION:

1.  Metastatic cholangiocarcinoma, status post biliary stent placement.

2.  Febrile neutropenia and pancytopenia.

3.  Clostridium difficile colitis.



PLAN:  The patient was discharged to [facility] on the

following medications; albuterol inhaler, Rhinocort nasal spray, Advair

Diskus 250/50.  She will be maintained on a heart-healthy diet.  Activities

per the rehab facility.







__________________________________________

BULMARO Kramer MD





DD:  06/16/2018 13:31:52

DT:  06/16/2018 13:34:04

Job # [id number]